# Patient Record
Sex: FEMALE | Race: WHITE | NOT HISPANIC OR LATINO | Employment: OTHER | URBAN - METROPOLITAN AREA
[De-identification: names, ages, dates, MRNs, and addresses within clinical notes are randomized per-mention and may not be internally consistent; named-entity substitution may affect disease eponyms.]

---

## 2023-09-14 PROBLEM — N83.299 OTHER OVARIAN CYST, UNSPECIFIED SIDE: Status: ACTIVE | Noted: 2023-09-14

## 2023-09-14 PROBLEM — M51.36 DEGENERATION OF LUMBAR INTERVERTEBRAL DISC: Status: ACTIVE | Noted: 2023-09-14

## 2023-09-14 PROBLEM — N28.89 RIGHT RENAL MASS: Status: ACTIVE | Noted: 2023-09-14

## 2023-09-14 PROBLEM — M51.369 DEGENERATION OF LUMBAR INTERVERTEBRAL DISC: Status: ACTIVE | Noted: 2023-09-14

## 2023-09-14 PROBLEM — M19.90 ARTHRITIS: Status: ACTIVE | Noted: 2023-09-14

## 2023-09-14 PROBLEM — M54.16 LUMBAR RADICULOPATHY: Status: ACTIVE | Noted: 2023-09-14

## 2023-09-14 PROBLEM — N36.2 URETHRAL CARUNCLE: Status: ACTIVE | Noted: 2023-09-14

## 2023-09-14 PROBLEM — N95.2 ATROPHIC VAGINITIS: Status: ACTIVE | Noted: 2023-09-14

## 2023-09-14 PROBLEM — M17.0 ARTHRITIS OF BOTH KNEES: Status: ACTIVE | Noted: 2023-09-14

## 2023-09-14 PROBLEM — M21.162 VARUS DEFORMITY, NOT ELSEWHERE CLASSIFIED, LEFT KNEE: Status: ACTIVE | Noted: 2023-09-14

## 2023-09-14 PROBLEM — M19.90 OSTEOARTHRITIS: Status: ACTIVE | Noted: 2023-09-14

## 2023-09-14 PROBLEM — G89.4 CHRONIC PAIN SYNDROME: Status: ACTIVE | Noted: 2023-09-14

## 2023-09-14 RX ORDER — ESTRADIOL 0.1 MG/G
2 CREAM VAGINAL DAILY
COMMUNITY

## 2023-09-14 RX ORDER — POLYETHYLENE GLYCOL 3350 17 G/17G
17 POWDER, FOR SOLUTION ORAL
COMMUNITY
Start: 2022-12-05

## 2023-09-14 RX ORDER — NAPROXEN SODIUM 220 MG
220 TABLET ORAL EVERY 12 HOURS PRN
COMMUNITY

## 2023-09-14 RX ORDER — ALPRAZOLAM 2 MG/1
2 TABLET ORAL
COMMUNITY

## 2023-09-14 RX ORDER — ASPIRIN 81 MG/1
81 TABLET ORAL DAILY
COMMUNITY
Start: 2022-12-05

## 2023-09-14 RX ORDER — FLUTICASONE PROPIONATE 50 MCG
1 SPRAY, SUSPENSION (ML) NASAL DAILY
COMMUNITY

## 2023-09-14 RX ORDER — HYDROGEN PEROXIDE 3 %
1 SOLUTION, NON-ORAL MISCELLANEOUS DAILY
COMMUNITY

## 2023-09-14 RX ORDER — ALPRAZOLAM 0.25 MG/1
0.25 TABLET ORAL 2 TIMES DAILY
COMMUNITY
End: 2023-11-15 | Stop reason: WASHOUT

## 2023-09-14 RX ORDER — AMLODIPINE BESYLATE 2.5 MG/1
2.5 TABLET ORAL
COMMUNITY
Start: 2022-08-10

## 2023-09-14 RX ORDER — ALPRAZOLAM 1 MG/1
1 TABLET ORAL EVERY 12 HOURS PRN
COMMUNITY
Start: 2023-09-14 | End: 2023-11-15 | Stop reason: WASHOUT

## 2023-09-14 RX ORDER — IBUPROFEN 200 MG
200 TABLET ORAL DAILY PRN
COMMUNITY

## 2023-09-14 RX ORDER — HYDROCODONE BITARTRATE AND ACETAMINOPHEN 5; 325 MG/1; MG/1
1 TABLET ORAL EVERY 8 HOURS PRN
COMMUNITY
Start: 2023-05-19 | End: 2023-11-15 | Stop reason: SDUPTHER

## 2023-09-14 RX ORDER — BUPRENORPHINE 5 UG/H
1 PATCH TRANSDERMAL
COMMUNITY
Start: 2023-05-19 | End: 2023-11-15 | Stop reason: WASHOUT

## 2023-09-14 RX ORDER — NALOXONE HYDROCHLORIDE 4 MG/.1ML
4 SPRAY NASAL AS NEEDED
COMMUNITY
Start: 2019-03-05

## 2023-09-20 PROBLEM — H25.9 AGE-RELATED CATARACT: Status: ACTIVE | Noted: 2023-09-20

## 2023-10-17 ENCOUNTER — OFFICE VISIT (OUTPATIENT)
Dept: UROLOGY | Facility: CLINIC | Age: 88
End: 2023-10-17
Payer: MEDICARE

## 2023-10-17 VITALS
WEIGHT: 122.4 LBS | BODY MASS INDEX: 22.39 KG/M2 | DIASTOLIC BLOOD PRESSURE: 60 MMHG | SYSTOLIC BLOOD PRESSURE: 138 MMHG | HEART RATE: 70 BPM

## 2023-10-17 DIAGNOSIS — N36.2 URETHRAL CARUNCLE: ICD-10-CM

## 2023-10-17 DIAGNOSIS — Z96.0 PRESENCE OF PESSARY: ICD-10-CM

## 2023-10-17 DIAGNOSIS — Z46.89 PESSARY MAINTENANCE: Primary | ICD-10-CM

## 2023-10-17 DIAGNOSIS — N95.2 ATROPHIC VAGINITIS: ICD-10-CM

## 2023-10-17 DIAGNOSIS — N81.10 VAGINAL PROLAPSE: ICD-10-CM

## 2023-10-17 PROCEDURE — 1036F TOBACCO NON-USER: CPT | Performed by: OBSTETRICS & GYNECOLOGY

## 2023-10-17 PROCEDURE — 99213 OFFICE O/P EST LOW 20 MIN: CPT | Performed by: OBSTETRICS & GYNECOLOGY

## 2023-10-17 ASSESSMENT — ENCOUNTER SYMPTOMS
DEPRESSION: 0
OCCASIONAL FEELINGS OF UNSTEADINESS: 0
LOSS OF SENSATION IN FEET: 0

## 2023-10-17 NOTE — PROGRESS NOTES
Subjective   Patient ID: Kika Amor is a 88 y.o. female who presents for pessary maintenance.    HPI  88-year-old with history of #4 cube pessary, stable 3 cm simple adnexal cyst, history of bilateral kidney cysts, significant vaginal atrophy and weakness with prolapsing hemorrhoids, lower extremity hardware preventing external rotation of the bilateral hip joints, presenting with a 2.75 inch Gellhorn short stem placed 5/16/2023    The patient appears to be extremely satisfied with the pessary, she denies any bugle complaints. She denies any vaginal complaints, no discharge.  She is utilizing the vaginal estrogen cream .    Ultrasound pelvis redemonstrated  previously-seen right ovarian cyst, now  measuring 3.3 x 2.9 x 2.7 cm, previously measuring 3.4 x 3.2 x 2.2 cm. She is noting resolution of her pain complaints.    She denies any lower urinary tract complaints. She denies any UTI like symptoms.     She denies any bowel related complaints, no fecal or flatal incontinence.    She has no other complaints.    From Previous note  88-year-old with history of #4 cube pessary, stable 3 cm simple adnexal cyst, history of bilateral kidney cysts, significant vaginal atrophy and weakness with prolapsing hemorrhoids, lower extremity hardware preventing external rotation of the bilateral hip joints, presenting for a 2.75 inch Gellhorn short stem pessary refitting.     The patient appears to be extremely satisfied with the pessary, she denies any bugle complaints. She reports pain associated with her right ovarian cysts, pelvic ultrasound form 1/27/2023 demonstrates 3.4 cm left ovarian cyst. According to her report while undergoing a kidney ultrasound the radiologist evaluated her pelvis and it was extremely painful, there is no official report of this.    She denies any lower urinary tract complaints. She denies any UTI like symptoms.     She denies any bowel related complaints, no fecal or flatal incontinence.    She  has no other complaints.    From Previous note  88-year-old with history of #4 cube pessary, stable 3 cm simple adnexal cyst, history of bilateral kidney cysts, significant vaginal atrophy and weakness with prolapsing hemorrhoids, lower extremity hardware preventing external rotation of the bilateral hip joints, presenting for a 2.75 inch Gellhorn short stem pessary refitting.      The patient s continues to note and is bothered by the bulge with her present 2.5 inch Gellhorn short stem in place.She states she is able to void in the morning but notes that its bothersome when she moves around. Denies any bowel changes with the pessary in place     She has no other complaints.     From previous note  88-year-old with history of #4 cube pessary, stable 3 cm simple adnexal cyst, history of bilateral kidney cysts, significant vaginal atrophy and weakness with prolapsing hemorrhoids presenting for pessary follow-up.     The patient s continues to note and is bothered by the bulge. She believes that the pessary was exposed but on exam it is in place. She also complaints of sensation of incomplete bladder emptying. She states she is able to void in the morning but notes that its bothersome when she moves around. Surgical correction was discussed at length. She is not utilizing the vaginal estrogen cream. however she notes bleeding.     She denies any bowel related complaints, no fecal or flatal incontinence.     She has no other complaints.     From previous note  88-year-old with history of #4 cube pessary, stable 3 cm simple adnexal cyst, history of bilateral kidney cysts, significant vaginal atrophy and weakness with prolapsing hemorrhoids having undergone acute pessary removal and pessary refitting on 2/9/2023.     The patient presents today with discomfort associated with this pessary, she states it is coming down and she is feeling the bulge despite the pessary being in place.      She denies any bowel related  "complaints, no fecal or flatal incontinence.     She has no other complaints.     From previous note  88-year-old with cube pessary in place, history of adnexal cyst on July 2022 [facility] ultrasound, history of bilateral kidney cysts, significant vaginal atrophy and weakness with prolapsing hemorrhoids presenting today for pessary refitting.     She has no other complaints.         From previous note  88-year-old presenting as a referral from Dr. Sophia Boyce with complaints of urinary urgency, kidney mass and history of gross hematuria with pessary in place.     The patient was previously seen by Dr. Sophia Boyce for kidney mass and gross hematuria with a benign cystoscopy performed 12/5/2022. She also has a pessary in place which Dr. Miles replaced with a smaller size and is working better. Most recent pessary maintenance was performed earlier in January. She states that this is a cube. She is having this removed and replaced every 2 months. She denies and bleeding except for the when the pessary is being manipulated. She also complaints of urinary urgency, she notes 1-2 episodes of nocturia but otherwise denies any significant daytime frequency or urgency. She wears a pad to avoid accidents but she does not leak unless she is \"bending over\". She denies any history of stress urinary incontinence.      She is not sexually active and was noted to have a 3 cm ovarian cyst in July 2022 on [facility] imaging. She denies any vaginal complaints, no abnormal vaginal bleeding or discharge.     She has a regular soft bowel movement, She denies any bowel related complaints, no fecal or flatal incontinence.     She has no other complaints.      Review of Systems  Constitutional: No fever, No chills and No fatigue.   Eyes: No vision problems and No dryness of the eyes.   ENT: No dry mouth, No hearing loss and No nosebleeds.   Cardiovascular: No chest pain, No palpitations and No orthopnea.   Respiratory: No shortness of breath, No cough " and No wheezing.   Gastrointestinal: No abdominal pain, No constipation, No nausea, No diarrhea, No vomiting and No melena.   Genitourinary: As noted in HPI.   Musculoskeletal: No back pain, No myalgias, No muscle weakness, No joint swelling and No leg edema.   Integumentary: No rashes, No skin lesion and No itching.   Neurological: No headache, No numbness and No dizziness.   Psychiatric: No sleep disturbances, No anxiety and No depression.   Endocrine: No hot flashes, No loss of hair and No hirsutism.   Hematologic/Lymphatic: No swollen glands, No tendency for easy bleeding and No tendency for easy bruising.   All other systems have been reviewed and are negative for complaint.        Objective   Physical Exam  Patient ID: Kika Amor is a 88 y.o. female.    Procedures  FP[facility] Procedure:  New placement of pessary and Pessary removal.   Indication (s): pelvic organ prolapse.   Findings include: cystocele, atrophy and A 2.5 inch Gellhorn short stem pessary was noted to be in place. This was removed with some difficulty and replaced with a 2.75 inch Gellhorn short stem.  Lidocaine gel was used.  Pessary Placement: KIKA was fitted with 2.75 inch Gellhorn short stem pessary.        Assessment/Plan      88-year-old with history of #4 cube pessary, stable 3 cm simple adnexal cyst, history of bilateral kidney cysts, significant vaginal atrophy and weakness with prolapsing hemorrhoids, lower extremity hardware preventing external rotation of the bilateral hip joints, presenting for a 2.75 inch Gellhorn short stem placed 5/16/2023     #1 the patient is to continue to follow-up with Dr. Sheppard. Recent ultrasound imaging 11/2/2022 demonstrated a solid 2.4 cm mass of the right pole and several cysts of the bilateral kidneys measuring up to 3 to 4 cm. She has had multiple imaging performed through [facility]. Patient will have yearly follow up with ultrasound per his recommendations.     #2 we discussed her previous imaging  performed in July 2022 demonstrating a 3 cm ovarian cyst. Repeat pelvic ultrasound 1/27/2023 demonstrates stable simple 3 cm ovarian cyst. She states that during her recent Kidney ultrasound her ultrasonographer imaged her right lower quadrant which caused significant  discomfort following evaluation.  This pain is significantly improved.  No further imaging is indicated at this time.     3. Patient's cube pessary was removed 2/9/2023 with excoriations and discharge noted and the patient was refitted with a 2.25 inch Gellhorn short stem. The patient was dissatisfied with this and it was refitted 3/7/2023 with a 2.5 inch Gellhorn short stem. She states that she continues to note vaginal bulging around this pessary when on her feet. She denies any pain noted some difficulty with urination. She was refitted with a 2.75 inch Gellhorn short stem 5/16/2023. We did discuss at length potential for surgical options. She is a candidate for a sacrospinous ligament suspension but she has significantly reduced external rotation of her bilateral hip joints. However there is access vaginally. We did briefly discussed the risks of bleeding, infection, damage surrounding tissues, postoperative restrictions, and success. Continue her Gellhorn pessary moving forward.  There was some difficulty in removing and replacing the pessary today 10/17/2023 and we will readdress the need for possible refitting with a 2.5 inch Gellhorn short stem at her follow-up visits.     4. The patient is unbothered by her hemorrhoids. We discussed the importance of fiber therapy daily to improve her bowel movement constipation complaints.     5. She will continue her vaginal estrogen therapy 3 times a week moving forward.     #6 We will follow-up in February/March 2024 for pessary maintenance.     YADIRA Youssef MD     Scribe Attestation  By signing my name below, Danae HAMMONDS Scribe attest that this documentation has been prepared under the  direction and in the presence of Sang Youssef MD. All medical record entries made by the Scribe were at my direction or personally dictated by me. I have reviewed the chart and agree that the record accurately reflects my personal performance of the history, physical exam, discussion and plan.

## 2023-11-15 ENCOUNTER — OFFICE VISIT (OUTPATIENT)
Dept: PAIN MEDICINE | Facility: CLINIC | Age: 88
End: 2023-11-15
Payer: MEDICARE

## 2023-11-15 VITALS
DIASTOLIC BLOOD PRESSURE: 72 MMHG | WEIGHT: 117 LBS | HEART RATE: 66 BPM | SYSTOLIC BLOOD PRESSURE: 139 MMHG | HEIGHT: 62 IN | BODY MASS INDEX: 21.53 KG/M2 | RESPIRATION RATE: 22 BRPM

## 2023-11-15 DIAGNOSIS — M25.551 PAIN OF BOTH HIP JOINTS: ICD-10-CM

## 2023-11-15 DIAGNOSIS — M25.552 PAIN OF BOTH HIP JOINTS: ICD-10-CM

## 2023-11-15 DIAGNOSIS — M17.0 PRIMARY OSTEOARTHRITIS OF KNEES, BILATERAL: ICD-10-CM

## 2023-11-15 DIAGNOSIS — M51.36 DEGENERATION OF LUMBAR INTERVERTEBRAL DISC: Primary | ICD-10-CM

## 2023-11-15 PROBLEM — H35.051 CHOROIDAL NEOVASCULARIZATION OF RIGHT EYE: Status: ACTIVE | Noted: 2020-10-01

## 2023-11-15 PROBLEM — H25.11 NUCLEAR SCLEROTIC CATARACT, RIGHT: Status: ACTIVE | Noted: 2020-10-01

## 2023-11-15 PROBLEM — N28.89 RENAL MASS, RIGHT: Status: ACTIVE | Noted: 2023-11-15

## 2023-11-15 PROBLEM — S22.49XA RIB FRACTURES: Status: ACTIVE | Noted: 2019-05-22

## 2023-11-15 PROBLEM — K44.9 HIATAL HERNIA: Status: ACTIVE | Noted: 2023-11-15

## 2023-11-15 PROBLEM — H35.3211 EXUDATIVE AGE-RELATED MACULAR DEGENERATION OF RIGHT EYE WITH ACTIVE CHOROIDAL NEOVASCULARIZATION (MULTI): Status: ACTIVE | Noted: 2020-10-01

## 2023-11-15 PROBLEM — H25.12 SENILE NUCLEAR CATARACT, LEFT: Status: ACTIVE | Noted: 2017-09-11

## 2023-11-15 PROBLEM — H47.10 OPTIC NERVE EDEMA: Status: ACTIVE | Noted: 2020-10-01

## 2023-11-15 PROBLEM — K57.92 DIVERTICULITIS: Status: ACTIVE | Noted: 2017-01-31

## 2023-11-15 PROCEDURE — 99214 OFFICE O/P EST MOD 30 MIN: CPT | Performed by: PHYSICIAN ASSISTANT

## 2023-11-15 PROCEDURE — 1160F RVW MEDS BY RX/DR IN RCRD: CPT | Performed by: PHYSICIAN ASSISTANT

## 2023-11-15 PROCEDURE — 1125F AMNT PAIN NOTED PAIN PRSNT: CPT | Performed by: PHYSICIAN ASSISTANT

## 2023-11-15 PROCEDURE — 1159F MED LIST DOCD IN RCRD: CPT | Performed by: PHYSICIAN ASSISTANT

## 2023-11-15 PROCEDURE — 1036F TOBACCO NON-USER: CPT | Performed by: PHYSICIAN ASSISTANT

## 2023-11-15 RX ORDER — HYDROCODONE BITARTRATE AND ACETAMINOPHEN 5; 325 MG/1; MG/1
1 TABLET ORAL EVERY 8 HOURS PRN
Qty: 90 TABLET | Refills: 0 | Status: SHIPPED | OUTPATIENT
Start: 2023-11-15 | End: 2023-12-15 | Stop reason: WASHOUT

## 2023-11-15 RX ORDER — DORZOLAMIDE HYDROCHLORIDE AND TIMOLOL MALEATE 20; 5 MG/ML; MG/ML
1 SOLUTION/ DROPS OPHTHALMIC 2 TIMES DAILY
COMMUNITY

## 2023-11-15 ASSESSMENT — LIFESTYLE VARIABLES
HOW MANY STANDARD DRINKS CONTAINING ALCOHOL DO YOU HAVE ON A TYPICAL DAY: PATIENT DOES NOT DRINK
HOW OFTEN DO YOU HAVE A DRINK CONTAINING ALCOHOL: NEVER
HOW OFTEN DO YOU HAVE SIX OR MORE DRINKS ON ONE OCCASION: NEVER
TOTAL SCORE: 0
SKIP TO QUESTIONS 9-10: 1
AUDIT-C TOTAL SCORE: 0

## 2023-11-15 ASSESSMENT — ENCOUNTER SYMPTOMS
ACTIVITY CHANGE: 0
VOMITING: 0
FATIGUE: 0
PALPITATIONS: 0
ARTHRALGIAS: 1
NAUSEA: 0
SORE THROAT: 0
VOICE CHANGE: 0
SLEEP DISTURBANCE: 0
COUGH: 0
UNEXPECTED WEIGHT CHANGE: 0
DIARRHEA: 0
SHORTNESS OF BREATH: 0
JOINT SWELLING: 1
BACK PAIN: 1
CHEST TIGHTNESS: 0
FEVER: 0
CHILLS: 0

## 2023-11-15 ASSESSMENT — PAIN SCALES - GENERAL
PAINLEVEL: 9
PAINLEVEL_OUTOF10: 9

## 2023-11-15 ASSESSMENT — PATIENT HEALTH QUESTIONNAIRE - PHQ9
2. FEELING DOWN, DEPRESSED OR HOPELESS: NOT AT ALL
1. LITTLE INTEREST OR PLEASURE IN DOING THINGS: NOT AT ALL
SUM OF ALL RESPONSES TO PHQ9 QUESTIONS 1 & 2: 0

## 2023-11-15 ASSESSMENT — PAIN DESCRIPTION - DESCRIPTORS: DESCRIPTORS: ACHING

## 2023-11-15 ASSESSMENT — PAIN - FUNCTIONAL ASSESSMENT: PAIN_FUNCTIONAL_ASSESSMENT: 0-10

## 2023-11-15 NOTE — PROGRESS NOTES
Patient has mid back pain.  Knees are bone-on-bone.  She also has hardware in her hips.    MEDICATION NAME: hydrocodone / acet  STRENGTH: 5/325 mg  LAST FILL DATE: 10/24/2023  DATE LAST TAKEN: 11/15/2023  QUANTITY FILLED: 90  QUANTITY REMAIN  COUNT COMPLETED BY: CLEVELAND SUBRAMANIAN LPN

## 2023-11-15 NOTE — PROGRESS NOTES
Subjective   Patient ID: Kika Amor is a 89 y.o. female who presents for Back Pain (Mid back, right knee.).  89-year-old female with bilateral hip pain related to fractures bilateral knee pain.  Patient did notes that she is having increases in her mid back pain.  She also is continually being monitored for renal mass.  She did notes that she is having glaucoma and has been added on new medication she now has to stop the Unisom which was beneficial for nighttime because it contains Benadryl and could be problematic for her vision.  She denies that she is still able to function within her home and the pain patch was that we used in the past was better for her back pain but not for the knee pain.  She continues to use 3-day she continues to use her benzodiazepine she does not use them at the same time and she will not drive when she is on her Xanax.  She does feel safe at home and she uses assistive devices.  She does note that there is a lot of neighbors in her neighborhood who will keep an eye on her and her sons are about 5 minutes away from her home    Back Pain  Pertinent negatives include no chest pain or fever.       Review of Systems   Constitutional:  Negative for activity change, chills, fatigue, fever and unexpected weight change.   HENT:  Negative for ear pain, sore throat and voice change.    Eyes:  Negative for visual disturbance.   Respiratory:  Negative for cough, chest tightness and shortness of breath.    Cardiovascular:  Negative for chest pain and palpitations.   Gastrointestinal:  Negative for diarrhea, nausea and vomiting.   Musculoskeletal:  Positive for arthralgias, back pain, gait problem and joint swelling.   Psychiatric/Behavioral:  Negative for behavioral problems, self-injury, sleep disturbance and suicidal ideas.        Objective   Physical Exam  Vitals reviewed.   Constitutional:       Appearance: Normal appearance.   HENT:      Head: Normocephalic and atraumatic.       Mouth/Throat:      Mouth: Mucous membranes are moist.   Neck:      Vascular: No JVD.   Pulmonary:      Effort: Pulmonary effort is normal. No tachypnea or bradypnea.   Abdominal:      Palpations: Abdomen is soft.   Musculoskeletal:      Thoracic back: Tenderness present.      Lumbar back: Tenderness present. Decreased range of motion. Positive left straight leg raise test. Negative right straight leg raise test.        Back:       Right knee: Swelling and crepitus present. Decreased range of motion. Tenderness present over the medial joint line and lateral joint line.      Left knee: Crepitus present. Decreased range of motion. Tenderness present over the medial joint line and lateral joint line.   Skin:     General: Skin is warm and dry.   Neurological:      Mental Status: She is alert and oriented to person, place, and time.   Psychiatric:         Mood and Affect: Mood normal.         Behavior: Behavior normal. Behavior is cooperative.       Assessment/Plan   Problem List Items Addressed This Visit             ICD-10-CM    Degeneration of lumbar intervertebral disc - Primary M51.36     I had nice discussion with the patient today our plan will be as follows.      Radiology: [ none at this time ]      Physically:  [ continue modification of activities, healthy lifestyle choice, discussed fall risk mitigation and use assistive devices. ]      Psychologically:  [ No acute psychological concerns ]      Medication: [I will refill the medications at the same dose and frequency. We will continue to monitor the patient bimonthly for compliance, adverse reaction or interations The patient continues to see benefit and improvement in their quality of life and ability to maintain ADLs. Patient educated about the risks of taking opioids and operating a motor vehicle. Patient reports no adverse side effects to current medication regimen. Current regimen does allow patient to maintain ADLs. Oarrs has been reviewed. No suspicion  of diversion or abuse. Compliance with medication regime, no use of illicit drugs, no sharing of narcotic medications with others, do not use others narcotic medication, and to avoid alcohol use. Patient has been educated on the risks, benefits, and alternatives of controlled substances as well as the proper way to store these medications.   The patient and I discussed the nature of this medication and its side effects. We discussed tolerance, physical dependence, psychological dependence, addiction and opioid-induced hyperalgesia ]      Duration:  [ 2 months ]      Intervention:  [ none at this time. Patient is stable.  ]           Relevant Medications    HYDROcodone-acetaminophen (Norco) 5-325 mg tablet    HYDROcodone-acetaminophen (Norco) 5-325 mg tablet    Primary osteoarthritis of knees, bilateral M17.0    Relevant Medications    HYDROcodone-acetaminophen (Norco) 5-325 mg tablet    HYDROcodone-acetaminophen (Norco) 5-325 mg tablet     Other Visit Diagnoses         Codes    Pain of both hip joints     M25.551, M25.552    Relevant Medications    HYDROcodone-acetaminophen (Norco) 5-325 mg tablet    HYDROcodone-acetaminophen (Norco) 5-325 mg tablet

## 2023-11-15 NOTE — ASSESSMENT & PLAN NOTE
I had nice discussion with the patient today our plan will be as follows.      Radiology: [ none at this time ]      Physically:  [ continue modification of activities, healthy lifestyle choice, discussed fall risk mitigation and use assistive devices. ]      Psychologically:  [ No acute psychological concerns ]      Medication: [I will refill the medications at the same dose and frequency. We will continue to monitor the patient bimonthly for compliance, adverse reaction or interations The patient continues to see benefit and improvement in their quality of life and ability to maintain ADLs. Patient educated about the risks of taking opioids and operating a motor vehicle. Patient reports no adverse side effects to current medication regimen. Current regimen does allow patient to maintain ADLs. Oarrs has been reviewed. No suspicion of diversion or abuse. Compliance with medication regime, no use of illicit drugs, no sharing of narcotic medications with others, do not use others narcotic medication, and to avoid alcohol use. Patient has been educated on the risks, benefits, and alternatives of controlled substances as well as the proper way to store these medications.   The patient and I discussed the nature of this medication and its side effects. We discussed tolerance, physical dependence, psychological dependence, addiction and opioid-induced hyperalgesia ]      Duration:  [ 2 months ]      Intervention:  [ none at this time. Patient is stable.  ]    
no...

## 2023-11-16 ENCOUNTER — OFFICE VISIT (OUTPATIENT)
Dept: ORTHOPEDIC SURGERY | Facility: CLINIC | Age: 88
End: 2023-11-16
Payer: MEDICARE

## 2023-11-16 DIAGNOSIS — M17.11 PRIMARY OSTEOARTHRITIS OF RIGHT KNEE: Primary | ICD-10-CM

## 2023-11-16 PROCEDURE — 2500000004 HC RX 250 GENERAL PHARMACY W/ HCPCS (ALT 636 FOR OP/ED): Performed by: ORTHOPAEDIC SURGERY

## 2023-11-16 PROCEDURE — 1125F AMNT PAIN NOTED PAIN PRSNT: CPT | Performed by: ORTHOPAEDIC SURGERY

## 2023-11-16 PROCEDURE — 2500000005 HC RX 250 GENERAL PHARMACY W/O HCPCS: Performed by: ORTHOPAEDIC SURGERY

## 2023-11-16 PROCEDURE — 1159F MED LIST DOCD IN RCRD: CPT | Performed by: ORTHOPAEDIC SURGERY

## 2023-11-16 PROCEDURE — 20610 DRAIN/INJ JOINT/BURSA W/O US: CPT | Performed by: ORTHOPAEDIC SURGERY

## 2023-11-16 PROCEDURE — 1036F TOBACCO NON-USER: CPT | Performed by: ORTHOPAEDIC SURGERY

## 2023-11-16 PROCEDURE — 1160F RVW MEDS BY RX/DR IN RCRD: CPT | Performed by: ORTHOPAEDIC SURGERY

## 2023-11-16 RX ORDER — TRIAMCINOLONE ACETONIDE 40 MG/ML
1 INJECTION, SUSPENSION INTRA-ARTICULAR; INTRAMUSCULAR
Status: COMPLETED | OUTPATIENT
Start: 2023-11-16 | End: 2023-11-16

## 2023-11-16 RX ORDER — LIDOCAINE HYDROCHLORIDE 10 MG/ML
4 INJECTION INFILTRATION; PERINEURAL
Status: COMPLETED | OUTPATIENT
Start: 2023-11-16 | End: 2023-11-16

## 2023-11-16 RX ADMIN — TRIAMCINOLONE ACETONIDE 1 ML: 40 INJECTION, SUSPENSION INTRA-ARTICULAR; INTRAMUSCULAR at 13:48

## 2023-11-16 RX ADMIN — LIDOCAINE HYDROCHLORIDE 4 ML: 10 INJECTION, SOLUTION INFILTRATION; PERINEURAL at 13:48

## 2023-11-16 ASSESSMENT — PAIN SCALES - GENERAL: PAINLEVEL_OUTOF10: 4

## 2023-11-16 NOTE — PROGRESS NOTES
L Inj/Asp: R knee on 11/16/2023 1:48 PM  Indications: pain and joint swelling  Details: 22 G needle, anterolateral approach  Medications: 1 mL triamcinolone acetonide 40 mg/mL; 4 mL lidocaine 10 mg/mL (1 %)    Discussion:  I discussed the conservative treatment options for knee osteoarthritis including but not limited to physical therapy, oral NSAIDS, activity and lifestyle modification, and corticosteroid injections. Pt has elected to undergo a cortisone injection today. I have explained the risk and benefits of an injection including the possibility of joint infection, bleeding, damage to cartilage, allergic reaction. Patient verbalized understanding and gave verbal consent wishes to proceed with a intra-articular cortisone injection for their knee.    Procedure:  After discussing the risk and benefits of the procedure, we proceeded with an intra-articular right knee injection.    With the patient's informed verbal consent, the right knee was prepped in standard sterile fashion with Chlorhexidine. The skin was then anesthetized with ethyl chloride spray and cleaned again with Chlorhexidine. The knee was then apirated/injected with a prefilled 20-gauge syringe of 40 mg Kenalog + 4 ml Lidocaine using the lateral approach without complications.  The patient tolerated this well and felt immediate initial relief of symptoms. A bandaid was applied and the patient ambulated out of the clinic on ther own accord without difficulty. Patient was instructed to avoid physical activity for 24-48 hours to prevent the knees from swelling and may ice the knees as tolerated. Patient should contact the office if any signs of of infection appear: redness, fever, chills, drainage, swelling or warmth to the knees.  Pt understands that the injections can be repeated no sooner than 3 months.    Procedure, treatment alternatives, risks and benefits explained, specific risks discussed. Consent was given by the patient. Immediately prior to  procedure a time out was called to verify the correct patient, procedure, equipment, support staff and site/side marked as required. Patient was prepped and draped in the usual sterile fashion.

## 2023-12-07 ENCOUNTER — OFFICE VISIT (OUTPATIENT)
Dept: ORTHOPEDIC SURGERY | Facility: CLINIC | Age: 88
End: 2023-12-07
Payer: MEDICARE

## 2023-12-07 DIAGNOSIS — M17.12 PRIMARY OSTEOARTHRITIS OF LEFT KNEE: Primary | ICD-10-CM

## 2023-12-07 PROCEDURE — 2500000005 HC RX 250 GENERAL PHARMACY W/O HCPCS: Performed by: ORTHOPAEDIC SURGERY

## 2023-12-07 PROCEDURE — 1160F RVW MEDS BY RX/DR IN RCRD: CPT | Performed by: ORTHOPAEDIC SURGERY

## 2023-12-07 PROCEDURE — 1125F AMNT PAIN NOTED PAIN PRSNT: CPT | Performed by: ORTHOPAEDIC SURGERY

## 2023-12-07 PROCEDURE — 2500000004 HC RX 250 GENERAL PHARMACY W/ HCPCS (ALT 636 FOR OP/ED): Performed by: ORTHOPAEDIC SURGERY

## 2023-12-07 PROCEDURE — 1036F TOBACCO NON-USER: CPT | Performed by: ORTHOPAEDIC SURGERY

## 2023-12-07 PROCEDURE — 20610 DRAIN/INJ JOINT/BURSA W/O US: CPT | Mod: LT | Performed by: ORTHOPAEDIC SURGERY

## 2023-12-07 PROCEDURE — 1159F MED LIST DOCD IN RCRD: CPT | Performed by: ORTHOPAEDIC SURGERY

## 2023-12-07 PROCEDURE — 20610 DRAIN/INJ JOINT/BURSA W/O US: CPT | Performed by: ORTHOPAEDIC SURGERY

## 2023-12-07 RX ORDER — TRIAMCINOLONE ACETONIDE 40 MG/ML
1 INJECTION, SUSPENSION INTRA-ARTICULAR; INTRAMUSCULAR
Status: COMPLETED | OUTPATIENT
Start: 2023-12-07 | End: 2023-12-07

## 2023-12-07 RX ORDER — LIDOCAINE HYDROCHLORIDE 10 MG/ML
4 INJECTION INFILTRATION; PERINEURAL
Status: COMPLETED | OUTPATIENT
Start: 2023-12-07 | End: 2023-12-07

## 2023-12-07 RX ADMIN — LIDOCAINE HYDROCHLORIDE 4 ML: 10 INJECTION, SOLUTION INFILTRATION; PERINEURAL at 14:20

## 2023-12-07 RX ADMIN — TRIAMCINOLONE ACETONIDE 1 ML: 400 INJECTION, SUSPENSION INTRA-ARTICULAR; INTRAMUSCULAR at 14:20

## 2023-12-07 ASSESSMENT — PAIN - FUNCTIONAL ASSESSMENT: PAIN_FUNCTIONAL_ASSESSMENT: 0-10

## 2023-12-07 ASSESSMENT — PAIN SCALES - GENERAL: PAINLEVEL_OUTOF10: 3

## 2024-01-18 ENCOUNTER — OFFICE VISIT (OUTPATIENT)
Dept: PAIN MEDICINE | Facility: CLINIC | Age: 89
End: 2024-01-18
Payer: MEDICARE

## 2024-01-18 VITALS
WEIGHT: 117 LBS | BODY MASS INDEX: 21.53 KG/M2 | SYSTOLIC BLOOD PRESSURE: 138 MMHG | DIASTOLIC BLOOD PRESSURE: 69 MMHG | HEIGHT: 62 IN | HEART RATE: 66 BPM

## 2024-01-18 DIAGNOSIS — M17.0 PRIMARY OSTEOARTHRITIS OF KNEES, BILATERAL: Primary | ICD-10-CM

## 2024-01-18 DIAGNOSIS — Z79.899 HISTORY OF ONGOING TREATMENT WITH HIGH-RISK MEDICATION: ICD-10-CM

## 2024-01-18 DIAGNOSIS — M54.16 LUMBAR RADICULOPATHY: ICD-10-CM

## 2024-01-18 DIAGNOSIS — M51.36 DEGENERATION OF LUMBAR INTERVERTEBRAL DISC: ICD-10-CM

## 2024-01-18 PROCEDURE — 99214 OFFICE O/P EST MOD 30 MIN: CPT | Performed by: PHYSICIAN ASSISTANT

## 2024-01-18 PROCEDURE — 1036F TOBACCO NON-USER: CPT | Performed by: PHYSICIAN ASSISTANT

## 2024-01-18 PROCEDURE — 1125F AMNT PAIN NOTED PAIN PRSNT: CPT | Performed by: PHYSICIAN ASSISTANT

## 2024-01-18 PROCEDURE — 1159F MED LIST DOCD IN RCRD: CPT | Performed by: PHYSICIAN ASSISTANT

## 2024-01-18 PROCEDURE — 80346 BENZODIAZEPINES1-12: CPT | Mod: WESLAB | Performed by: PHYSICIAN ASSISTANT

## 2024-01-18 PROCEDURE — 80354 DRUG SCREENING FENTANYL: CPT | Mod: WESLAB,MUE | Performed by: PHYSICIAN ASSISTANT

## 2024-01-18 PROCEDURE — 1160F RVW MEDS BY RX/DR IN RCRD: CPT | Performed by: PHYSICIAN ASSISTANT

## 2024-01-18 PROCEDURE — 80307 DRUG TEST PRSMV CHEM ANLYZR: CPT | Mod: WESLAB | Performed by: PHYSICIAN ASSISTANT

## 2024-01-18 RX ORDER — HYDROCODONE BITARTRATE AND ACETAMINOPHEN 5; 325 MG/1; MG/1
1 TABLET ORAL EVERY 4 HOURS PRN
Qty: 90 TABLET | Refills: 0 | Status: SHIPPED | OUTPATIENT
Start: 2024-01-18 | End: 2024-02-17 | Stop reason: WASHOUT

## 2024-01-18 RX ORDER — HYDROCODONE BITARTRATE AND ACETAMINOPHEN 5; 325 MG/1; MG/1
1 TABLET ORAL EVERY 4 HOURS PRN
COMMUNITY
Start: 2023-12-26 | End: 2024-01-18 | Stop reason: SDUPTHER

## 2024-01-18 ASSESSMENT — ENCOUNTER SYMPTOMS
NAUSEA: 0
FEVER: 0
BACK PAIN: 1
VOICE CHANGE: 0
ACTIVITY CHANGE: 0
VOMITING: 0
CHILLS: 0
ARTHRALGIAS: 1
UNEXPECTED WEIGHT CHANGE: 0
JOINT SWELLING: 1
COUGH: 0
SHORTNESS OF BREATH: 0
CHEST TIGHTNESS: 0
PALPITATIONS: 0
DIARRHEA: 0
SLEEP DISTURBANCE: 0
FATIGUE: 0
SORE THROAT: 0

## 2024-01-18 ASSESSMENT — PAIN SCALES - GENERAL: PAINLEVEL_OUTOF10: 8

## 2024-01-18 ASSESSMENT — PATIENT HEALTH QUESTIONNAIRE - PHQ9
SUM OF ALL RESPONSES TO PHQ9 QUESTIONS 1 AND 2: 0
1. LITTLE INTEREST OR PLEASURE IN DOING THINGS: NOT AT ALL
2. FEELING DOWN, DEPRESSED OR HOPELESS: NOT AT ALL

## 2024-01-18 ASSESSMENT — PAIN DESCRIPTION - DESCRIPTORS: DESCRIPTORS: SHOOTING;SHARP

## 2024-01-18 ASSESSMENT — PAIN - FUNCTIONAL ASSESSMENT: PAIN_FUNCTIONAL_ASSESSMENT: 0-10

## 2024-01-18 NOTE — PROGRESS NOTES
MEDICATION NAME: hydrocodone/ acetaminophen  STRENGTH: 5-325 mg  LAST FILL DATE: 2023  DATE LAST TAKEN: 2024  QUANTITY FILLED: 90  QUANTITY REMAININ  COUNT COMPLETED BY: GHASSAN LOFTON RN and ZULMA Boyle    CONTROLLED SUBSTANCES AGREEMENT LAST SIGNED: 2024  ORT LAST COMPLETED:2023  Modified [employer] disability form filled out annually.       [id number]

## 2024-01-18 NOTE — PROGRESS NOTES
Subjective   Patient ID: Kika Amor is a 89 y.o. female who presents for Back Pain.  Patient is an 89-year-old female with bilateral knee severe osteoarthritis lumbosacral radiculopathy degenerative de ~vertebral disc displacement 6 spine pain with spasms history of a renal mass macular degeneration presents today for follow-up.  Patient did notes that she will be seeing orthopedic that potentially will be providing her steroidal injections in her knees.  She does note that that since the temperature have dropped freezing she has been having increasing symptoms.  She understands that this is related to osteoarthritis she has done some considerations potentially moving to warmer weather but her family is located here    Back Pain  Pertinent negatives include no chest pain or fever.       Review of Systems   Constitutional:  Negative for activity change, chills, fatigue, fever and unexpected weight change.   HENT:  Negative for ear pain, sore throat and voice change.    Eyes:  Negative for visual disturbance.   Respiratory:  Negative for cough, chest tightness and shortness of breath.    Cardiovascular:  Negative for chest pain and palpitations.   Gastrointestinal:  Negative for diarrhea, nausea and vomiting.   Musculoskeletal:  Positive for arthralgias, back pain, gait problem and joint swelling.   Psychiatric/Behavioral:  Negative for behavioral problems, self-injury, sleep disturbance and suicidal ideas.        Objective   Physical Exam  Vitals reviewed.   Constitutional:       Appearance: Normal appearance.   HENT:      Head: Normocephalic and atraumatic.      Mouth/Throat:      Mouth: Mucous membranes are moist.   Neck:      Vascular: No JVD.   Pulmonary:      Effort: Pulmonary effort is normal. No tachypnea or bradypnea.   Abdominal:      Palpations: Abdomen is soft.   Musculoskeletal:      Thoracic back: Tenderness present.      Lumbar back: Tenderness present. Decreased range of motion. Positive left  straight leg raise test. Negative right straight leg raise test.        Back:       Right knee: Swelling and crepitus present. Decreased range of motion. Tenderness present over the medial joint line and lateral joint line.      Left knee: Crepitus present. Decreased range of motion. Tenderness present over the medial joint line and lateral joint line.   Skin:     General: Skin is warm and dry.   Neurological:      Mental Status: She is alert and oriented to person, place, and time.   Psychiatric:         Mood and Affect: Mood normal.         Behavior: Behavior normal. Behavior is cooperative.       Assessment/Plan   Problem List Items Addressed This Visit             ICD-10-CM    Degeneration of lumbar intervertebral disc M51.36    Lumbar radiculopathy M54.16    Primary osteoarthritis of knees, bilateral - Primary M17.0     I had nice discussion with the patient today our plan will be as follows.      Radiology: [ none at this time ]      Physically:  [ continue modification of activities, healthy lifestyle choice ]      Psychologically:  [ No acute psychological concerns ]      Medication: [I will refill the medications at the same dose and frequency. We will continue to monitor the patient bimonthly for compliance, adverse reaction or interations The patient continues to see benefit and improvement in their quality of life and ability to maintain ADLs. Patient educated about the risks of taking opioids and operating a motor vehicle. Patient reports no adverse side effects to current medication regimen. Current regimen does allow patient to maintain ADLs. Oarrs has been reviewed. No suspicion of diversion or abuse. Compliance with medication regime, no use of illicit drugs, no sharing of narcotic medications with others, do not use others narcotic medication, and to avoid alcohol use. Patient has been educated on the risks, benefits, and alternatives of controlled substances as well as the proper way to store  these medications.   The patient and I discussed the nature of this medication and its side effects. We discussed tolerance, physical dependence, psychological dependence, addiction and opioid-induced hyperalgesia  Pt to submit sample for tox screen]      Duration:  [ 2 months ]      Intervention:  [ none at this time. Patient is stable.  ]         Steve Huffman PA-C 01/18/24 2:54 PM

## 2024-01-19 LAB
AMPHETAMINES UR QL SCN: NORMAL
BARBITURATES UR QL SCN: NORMAL
BZE UR QL SCN: NORMAL
CANNABINOIDS UR QL SCN: NORMAL
CREAT UR-MCNC: 194.6 MG/DL (ref 20–320)
PCP UR QL SCN: NORMAL

## 2024-01-23 LAB
1OH-MIDAZOLAM UR CFM-MCNC: <25 NG/ML
6MAM UR CFM-MCNC: <25 NG/ML
7AMINOCLONAZEPAM UR CFM-MCNC: <25 NG/ML
A-OH ALPRAZ UR CFM-MCNC: >1000 NG/ML
ALPRAZ UR CFM-MCNC: 778 NG/ML
CHLORDIAZEP UR CFM-MCNC: <25 NG/ML
CLONAZEPAM UR CFM-MCNC: <25 NG/ML
CODEINE UR CFM-MCNC: <50 NG/ML
DIAZEPAM UR CFM-MCNC: <25 NG/ML
EDDP UR CFM-MCNC: <25 NG/ML
FENTANYL UR CFM-MCNC: <2.5 NG/ML
HYDROCODONE CTO UR CFM-MCNC: >2500 NG/ML
HYDROMORPHONE UR CFM-MCNC: 1879 NG/ML
LORAZEPAM UR CFM-MCNC: <25 NG/ML
METHADONE UR CFM-MCNC: <25 NG/ML
MIDAZOLAM UR CFM-MCNC: <25 NG/ML
MORPHINE UR CFM-MCNC: <50 NG/ML
NORDIAZEPAM UR CFM-MCNC: <25 NG/ML
NORFENTANYL UR CFM-MCNC: <2.5 NG/ML
NORHYDROCODONE UR CFM-MCNC: >1000 NG/ML
NOROXYCODONE UR CFM-MCNC: <25 NG/ML
NORTRAMADOL UR-MCNC: <50 NG/ML
OXAZEPAM UR CFM-MCNC: <25 NG/ML
OXYCODONE UR CFM-MCNC: <25 NG/ML
OXYMORPHONE UR CFM-MCNC: <25 NG/ML
TEMAZEPAM UR CFM-MCNC: <25 NG/ML
TRAMADOL UR CFM-MCNC: <50 NG/ML
ZOLPIDEM UR CFM-MCNC: <25 NG/ML
ZOLPIDEM UR-MCNC: <25 NG/ML

## 2024-02-20 ENCOUNTER — APPOINTMENT (OUTPATIENT)
Dept: UROLOGY | Facility: CLINIC | Age: 89
End: 2024-02-20
Payer: MEDICARE

## 2024-02-20 NOTE — PROGRESS NOTES
Subjective   Patient ID: Kika Amor is a 89 y.o. female who presents for pessary maintenance.    HPI  88-year-old with history of #4 cube pessary, stable 3 cm simple adnexal cyst, history of bilateral kidney cysts, significant vaginal atrophy and weakness with prolapsing hemorrhoids, lower extremity hardware preventing external rotation of the bilateral hip joints, presenting for a 2.75 inch Gellhorn short stem placed 5/16/2023    From Previous note  88-year-old with history of #4 cube pessary, stable 3 cm simple adnexal cyst, history of bilateral kidney cysts, significant vaginal atrophy and weakness with prolapsing hemorrhoids, lower extremity hardware preventing external rotation of the bilateral hip joints, presenting with a 2.75 inch Gellhorn short stem placed 5/16/2023    The patient appears to be extremely satisfied with the pessary, she denies any bugle complaints. She denies any vaginal complaints, no discharge.  She is utilizing the vaginal estrogen cream .    Ultrasound pelvis redemonstrated  previously-seen right ovarian cyst, now  measuring 3.3 x 2.9 x 2.7 cm, previously measuring 3.4 x 3.2 x 2.2 cm. She is noting resolution of her pain complaints.    She denies any lower urinary tract complaints. She denies any UTI like symptoms.     She denies any bowel related complaints, no fecal or flatal incontinence.    She has no other complaints.    From Previous note  88-year-old with history of #4 cube pessary, stable 3 cm simple adnexal cyst, history of bilateral kidney cysts, significant vaginal atrophy and weakness with prolapsing hemorrhoids, lower extremity hardware preventing external rotation of the bilateral hip joints, presenting for a 2.75 inch Gellhorn short stem pessary refitting.     The patient appears to be extremely satisfied with the pessary, she denies any bugle complaints. She reports pain associated with her right ovarian cysts, pelvic ultrasound form 1/27/2023 demonstrates 3.4 cm  left ovarian cyst. According to her report while undergoing a kidney ultrasound the radiologist evaluated her pelvis and it was extremely painful, there is no official report of this.    She denies any lower urinary tract complaints. She denies any UTI like symptoms.     She denies any bowel related complaints, no fecal or flatal incontinence.    She has no other complaints.    From Previous note  88-year-old with history of #4 cube pessary, stable 3 cm simple adnexal cyst, history of bilateral kidney cysts, significant vaginal atrophy and weakness with prolapsing hemorrhoids, lower extremity hardware preventing external rotation of the bilateral hip joints, presenting for a 2.75 inch Gellhorn short stem pessary refitting.      The patient s continues to note and is bothered by the bulge with her present 2.5 inch Gellhorn short stem in place.She states she is able to void in the morning but notes that its bothersome when she moves around. Denies any bowel changes with the pessary in place     She has no other complaints.     From previous note  88-year-old with history of #4 cube pessary, stable 3 cm simple adnexal cyst, history of bilateral kidney cysts, significant vaginal atrophy and weakness with prolapsing hemorrhoids presenting for pessary follow-up.     The patient s continues to note and is bothered by the bulge. She believes that the pessary was exposed but on exam it is in place. She also complaints of sensation of incomplete bladder emptying. She states she is able to void in the morning but notes that its bothersome when she moves around. Surgical correction was discussed at length. She is not utilizing the vaginal estrogen cream. however she notes bleeding.     She denies any bowel related complaints, no fecal or flatal incontinence.     She has no other complaints.     From previous note  88-year-old with history of #4 cube pessary, stable 3 cm simple adnexal cyst, history of bilateral kidney cysts,  "significant vaginal atrophy and weakness with prolapsing hemorrhoids having undergone acute pessary removal and pessary refitting on 2/9/2023.     The patient presents today with discomfort associated with this pessary, she states it is coming down and she is feeling the bulge despite the pessary being in place.      She denies any bowel related complaints, no fecal or flatal incontinence.     She has no other complaints.     From previous note  88-year-old with cube pessary in place, history of adnexal cyst on July 2022 [facility] ultrasound, history of bilateral kidney cysts, significant vaginal atrophy and weakness with prolapsing hemorrhoids presenting today for pessary refitting.     She has no other complaints.         From previous note  88-year-old presenting as a referral from Dr. Sophia Boyce with complaints of urinary urgency, kidney mass and history of gross hematuria with pessary in place.     The patient was previously seen by Dr. Sophia Boyce for kidney mass and gross hematuria with a benign cystoscopy performed 12/5/2022. She also has a pessary in place which Dr. Miles replaced with a smaller size and is working better. Most recent pessary maintenance was performed earlier in January. She states that this is a cube. She is having this removed and replaced every 2 months. She denies and bleeding except for the when the pessary is being manipulated. She also complaints of urinary urgency, she notes 1-2 episodes of nocturia but otherwise denies any significant daytime frequency or urgency. She wears a pad to avoid accidents but she does not leak unless she is \"bending over\". She denies any history of stress urinary incontinence.      She is not sexually active and was noted to have a 3 cm ovarian cyst in July 2022 on [facility] imaging. She denies any vaginal complaints, no abnormal vaginal bleeding or discharge.     She has a regular soft bowel movement, She denies any bowel related complaints, no fecal or flatal " incontinence.     She has no other complaints.      Review of Systems  Constitutional: No fever, No chills and No fatigue.   Eyes: No vision problems and No dryness of the eyes.   ENT: No dry mouth, No hearing loss and No nosebleeds.   Cardiovascular: No chest pain, No palpitations and No orthopnea.   Respiratory: No shortness of breath, No cough and No wheezing.   Gastrointestinal: No abdominal pain, No constipation, No nausea, No diarrhea, No vomiting and No melena.   Genitourinary: As noted in HPI.   Musculoskeletal: No back pain, No myalgias, No muscle weakness, No joint swelling and No leg edema.   Integumentary: No rashes, No skin lesion and No itching.   Neurological: No headache, No numbness and No dizziness.   Psychiatric: No sleep disturbances, No anxiety and No depression.   Endocrine: No hot flashes, No loss of hair and No hirsutism.   Hematologic/Lymphatic: No swollen glands, No tendency for easy bleeding and No tendency for easy bruising.   All other systems have been reviewed and are negative for complaint.        Objective   Physical Exam  Patient ID: Kika Amor is a 89 y.o. female.    Procedures  FPMRS Procedure:  New placement of pessary and Pessary removal.   Indication (s): pelvic organ prolapse.   Findings include: cystocele, atrophy and A 2.5 inch Gellhorn short stem pessary was noted to be in place. This was removed with some difficulty and replaced with a 2.75 inch Gellhorn short stem.  Lidocaine gel was used.  Pessary Placement: KIKA was fitted with 2.75 inch Gellhorn short stem pessary.        Assessment/Plan      88-year-old with history of #4 cube pessary, stable 3 cm simple adnexal cyst, history of bilateral kidney cysts, significant vaginal atrophy and weakness with prolapsing hemorrhoids, lower extremity hardware preventing external rotation of the bilateral hip joints, presenting for a 2.75 inch Gellhorn short stem placed 5/16/2023     #1 the patient is to continue to  follow-up with Dr. Sheppard. Recent ultrasound imaging 11/2/2022 demonstrated a solid 2.4 cm mass of the right pole and several cysts of the bilateral kidneys measuring up to 3 to 4 cm. She has had multiple imaging performed through [facility]. Patient will have yearly follow up with ultrasound per his recommendations.     #2 we discussed her previous imaging performed in July 2022 demonstrating a 3 cm ovarian cyst. Repeat pelvic ultrasound 1/27/2023 demonstrates stable simple 3 cm ovarian cyst. She states that during her recent Kidney ultrasound her ultrasonographer imaged her right lower quadrant which caused significant  discomfort following evaluation.  This pain is significantly improved.  No further imaging is indicated at this time.     3. Patient's cube pessary was removed 2/9/2023 with excoriations and discharge noted and the patient was refitted with a 2.25 inch Gellhorn short stem. The patient was dissatisfied with this and it was refitted 3/7/2023 with a 2.5 inch Gellhorn short stem. She states that she continues to note vaginal bulging around this pessary when on her feet. She denies any pain noted some difficulty with urination. She was refitted with a 2.75 inch Gellhorn short stem 5/16/2023. We did discuss at length potential for surgical options. She is a candidate for a sacrospinous ligament suspension but she has significantly reduced external rotation of her bilateral hip joints. However there is access vaginally. We did briefly discussed the risks of bleeding, infection, damage surrounding tissues, postoperative restrictions, and success. Continue her Gellhorn pessary moving forward.  There was some difficulty in removing and replacing the pessary today 10/17/2023 and we will readdress the need for possible refitting with a 2.5 inch Gellhorn short stem at her follow-up visits.     4. The patient is unbothered by her hemorrhoids. We discussed the importance of fiber therapy daily to improve her bowel  movement constipation complaints.     5. She will continue her vaginal estrogen therapy 3 times a week moving forward.     #6 We will follow-up in February/March 2024 for pessary maintenance.     YADIRA Youssef MD     Scribe Attestation  By signing my name below, IDanae Scribe attest that this documentation has been prepared under the direction and in the presence of Sang Youssef MD. All medical record entries made by the Scribe were at my direction or personally dictated by me. I have reviewed the chart and agree that the record accurately reflects my personal performance of the history, physical exam, discussion and plan.

## 2024-02-27 ENCOUNTER — OFFICE VISIT (OUTPATIENT)
Dept: UROLOGY | Facility: CLINIC | Age: 89
End: 2024-02-27
Payer: MEDICARE

## 2024-02-27 VITALS — WEIGHT: 119.1 LBS | BODY MASS INDEX: 21.78 KG/M2

## 2024-02-27 DIAGNOSIS — Z46.89 ENCOUNTER FOR FITTING AND ADJUSTMENT OF PESSARY: ICD-10-CM

## 2024-02-27 DIAGNOSIS — Z46.89 PESSARY MAINTENANCE: ICD-10-CM

## 2024-02-27 DIAGNOSIS — Z96.0 PRESENCE OF PESSARY: ICD-10-CM

## 2024-02-27 DIAGNOSIS — N81.10 VAGINAL PROLAPSE: ICD-10-CM

## 2024-02-27 DIAGNOSIS — N95.2 ATROPHIC VAGINITIS: ICD-10-CM

## 2024-02-27 DIAGNOSIS — N36.2 URETHRAL CARUNCLE: Primary | ICD-10-CM

## 2024-02-27 PROCEDURE — 57160 INSERT PESSARY/OTHER DEVICE: CPT | Performed by: OBSTETRICS & GYNECOLOGY

## 2024-02-27 PROCEDURE — 1125F AMNT PAIN NOTED PAIN PRSNT: CPT | Performed by: OBSTETRICS & GYNECOLOGY

## 2024-02-27 PROCEDURE — 99214 OFFICE O/P EST MOD 30 MIN: CPT | Performed by: OBSTETRICS & GYNECOLOGY

## 2024-02-27 PROCEDURE — 99214 OFFICE O/P EST MOD 30 MIN: CPT | Mod: 25 | Performed by: OBSTETRICS & GYNECOLOGY

## 2024-02-27 PROCEDURE — 1159F MED LIST DOCD IN RCRD: CPT | Performed by: OBSTETRICS & GYNECOLOGY

## 2024-02-27 PROCEDURE — 1160F RVW MEDS BY RX/DR IN RCRD: CPT | Performed by: OBSTETRICS & GYNECOLOGY

## 2024-02-27 PROCEDURE — 1036F TOBACCO NON-USER: CPT | Performed by: OBSTETRICS & GYNECOLOGY

## 2024-02-27 NOTE — PATIENT INSTRUCTIONS
Please continue her vaginal estrogen therapy 2-3 times a week.    Please follow-up in June/July 2024 for pessary maintenance.    Please follow-up with Dr. Sheppard per his recommendations.    Please contact the clinic with any questions or concerns.    [phone]

## 2024-02-27 NOTE — PROGRESS NOTES
Subjective   Patient ID: Kika Amor is a 89 y.o. female who presents for pessary maintenance.    HPI  88-year-old with history of #4 cube pessary, stable 3 cm simple adnexal cyst, history of bilateral kidney cysts, significant vaginal atrophy and weakness with prolapsing hemorrhoids, lower extremity hardware preventing external rotation of the bilateral hip joints, presenting with 2.75 inch Gellhorn short stem originally placed 5/16/2023      The patient appears to be satisfied with the pessary, she denies any bugle complaints. She denies any vaginal complaints, no discharge.  She is utilizing the vaginal estrogen cream .    She  did note leaking when she bend over but this has improved since, she denies any lower urinary tract complaints. She denies any UTI like symptoms.     She does note bleeding hemorrhoids, no fecal or flatal incontinence.    She has no other complaints.    From Previous note  88-year-old with history of #4 cube pessary, stable 3 cm simple adnexal cyst, history of bilateral kidney cysts, significant vaginal atrophy and weakness with prolapsing hemorrhoids, lower extremity hardware preventing external rotation of the bilateral hip joints, presenting with a 2.75 inch Gellhorn short stem placed 5/16/2023    The patient appears to be extremely satisfied with the pessary, she denies any bugle complaints. She denies any vaginal complaints, no discharge.  She is utilizing the vaginal estrogen cream .    Ultrasound pelvis redemonstrated  previously-seen right ovarian cyst, now  measuring 3.3 x 2.9 x 2.7 cm, previously measuring 3.4 x 3.2 x 2.2 cm. She is noting resolution of her pain complaints.    She denies any lower urinary tract complaints. She denies any UTI like symptoms.     She denies any bowel related complaints, no fecal or flatal incontinence.    She has no other complaints.    From Previous note  88-year-old with history of #4 cube pessary, stable 3 cm simple adnexal cyst, history of  bilateral kidney cysts, significant vaginal atrophy and weakness with prolapsing hemorrhoids, lower extremity hardware preventing external rotation of the bilateral hip joints, presenting for a 2.75 inch Gellhorn short stem pessary refitting.     The patient appears to be extremely satisfied with the pessary, she denies any bugle complaints. She reports pain associated with her right ovarian cysts, pelvic ultrasound form 1/27/2023 demonstrates 3.4 cm left ovarian cyst. According to her report while undergoing a kidney ultrasound the radiologist evaluated her pelvis and it was extremely painful, there is no official report of this.    She denies any lower urinary tract complaints. She denies any UTI like symptoms.     She denies any bowel related complaints, no fecal or flatal incontinence.    She has no other complaints.    From Previous note  88-year-old with history of #4 cube pessary, stable 3 cm simple adnexal cyst, history of bilateral kidney cysts, significant vaginal atrophy and weakness with prolapsing hemorrhoids, lower extremity hardware preventing external rotation of the bilateral hip joints, presenting for a 2.75 inch Gellhorn short stem pessary refitting.      The patient s continues to note and is bothered by the bulge with her present 2.5 inch Gellhorn short stem in place.She states she is able to void in the morning but notes that its bothersome when she moves around. Denies any bowel changes with the pessary in place     She has no other complaints.     From previous note  88-year-old with history of #4 cube pessary, stable 3 cm simple adnexal cyst, history of bilateral kidney cysts, significant vaginal atrophy and weakness with prolapsing hemorrhoids presenting for pessary follow-up.     The patient s continues to note and is bothered by the bulge. She believes that the pessary was exposed but on exam it is in place. She also complaints of sensation of incomplete bladder emptying. She states she  is able to void in the morning but notes that its bothersome when she moves around. Surgical correction was discussed at length. She is not utilizing the vaginal estrogen cream. however she notes bleeding.     She denies any bowel related complaints, no fecal or flatal incontinence.     She has no other complaints.     From previous note  88-year-old with history of #4 cube pessary, stable 3 cm simple adnexal cyst, history of bilateral kidney cysts, significant vaginal atrophy and weakness with prolapsing hemorrhoids having undergone acute pessary removal and pessary refitting on 2/9/2023.     The patient presents today with discomfort associated with this pessary, she states it is coming down and she is feeling the bulge despite the pessary being in place.      She denies any bowel related complaints, no fecal or flatal incontinence.     She has no other complaints.     From previous note  88-year-old with cube pessary in place, history of adnexal cyst on July 2022 [facility] ultrasound, history of bilateral kidney cysts, significant vaginal atrophy and weakness with prolapsing hemorrhoids presenting today for pessary refitting.     She has no other complaints.         From previous note  88-year-old presenting as a referral from Dr. Sophia Boyce with complaints of urinary urgency, kidney mass and history of gross hematuria with pessary in place.     The patient was previously seen by Dr. Sophia Boyce for kidney mass and gross hematuria with a benign cystoscopy performed 12/5/2022. She also has a pessary in place which Dr. Miles replaced with a smaller size and is working better. Most recent pessary maintenance was performed earlier in January. She states that this is a cube. She is having this removed and replaced every 2 months. She denies and bleeding except for the when the pessary is being manipulated. She also complaints of urinary urgency, she notes 1-2 episodes of nocturia but otherwise denies any significant  "daytime frequency or urgency. She wears a pad to avoid accidents but she does not leak unless she is \"bending over\". She denies any history of stress urinary incontinence.      She is not sexually active and was noted to have a 3 cm ovarian cyst in July 2022 on [facility] imaging. She denies any vaginal complaints, no abnormal vaginal bleeding or discharge.     She has a regular soft bowel movement, She denies any bowel related complaints, no fecal or flatal incontinence.     She has no other complaints.      Review of Systems  Constitutional: No fever, No chills and No fatigue.   Eyes: No vision problems and No dryness of the eyes.   ENT: No dry mouth, No hearing loss and No nosebleeds.   Cardiovascular: No chest pain, No palpitations and No orthopnea.   Respiratory: No shortness of breath, No cough and No wheezing.   Gastrointestinal: No abdominal pain, No constipation, No nausea, No diarrhea, No vomiting and No melena.   Genitourinary: As noted in HPI.   Musculoskeletal: No back pain, No myalgias, No muscle weakness, No joint swelling and No leg edema.   Integumentary: No rashes, No skin lesion and No itching.   Neurological: No headache, No numbness and No dizziness.   Psychiatric: No sleep disturbances, No anxiety and No depression.   Endocrine: No hot flashes, No loss of hair and No hirsutism.   Hematologic/Lymphatic: No swollen glands, No tendency for easy bleeding and No tendency for easy bruising.   All other systems have been reviewed and are negative for complaint.        Objective   Physical Exam  Patient ID: Kika Amor is a 89 y.o. female.    Procedures  FPMRS Procedure:  New placement of pessary and Pessary removal.   Indication (s): pelvic organ prolapse.   Findings include: cystocele, atrophy and A 2.75 inch Gellhorn short stem pessary was noted to be in place. This was removed with some difficulty and replaced with a 2.5 inch Gellhorn short stem.  Lidocaine gel was used.  Pessary Placement: KIKA" was fitted with 2.5 inch Gellhorn short stem pessary.        Assessment/Plan      88-year-old with history of #4 cube pessary, stable 3 cm simple adnexal cyst, history of bilateral kidney cysts, significant vaginal atrophy and weakness with prolapsing hemorrhoids, lower extremity hardware preventing external rotation of the bilateral hip joints, presenting for pessary refitting from a 2.75 inch Gellhorn short stem placed 5/16/2023 to a 2.5 inch Gellhorn short stem today 2/27/2024.     #1 the patient is to continue to follow-up with Dr. Sheppard.  She is known to have a solid 2.4 cm mass of the right pole and several cysts of the bilateral kidneys measuring up to 3 to 4 cm. She has had multiple imaging performed through [facility]. Patient will have yearly follow up with ultrasound per his recommendations.     #2 we discussed her previous imaging performed in July 2022 demonstrating a 3 cm ovarian cyst. Repeat pelvic ultrasound 1/27/2023 demonstrates stable simple 3 cm ovarian cyst. She states that during her recent Kidney ultrasound her ultrasonographer imaged her right lower quadrant which caused significant  discomfort following evaluation.  This pain is significantly improved.  No further imaging is indicated at this time.     3. Patient's cube pessary was removed 2/9/2023 with excoriations and discharge noted and the patient was refitted with a 2.25 inch Gellhorn short stem. The patient was dissatisfied with this and it was refitted 3/7/2023 with a 2.5 inch Gellhorn short stem.  She noted vaginal bulging around this pessary when on her feet. She denies any pain noted some difficulty with urination. She was refitted with a 2.75 inch Gellhorn short stem 5/16/2023. We did discuss at length potential for surgical options. She is a candidate for a sacrospinous ligament suspension but she has significantly reduced external rotation of her bilateral hip joints. However there is access vaginally. We did briefly discussed the  risks of bleeding, infection, damage surrounding tissues, postoperative restrictions, and success. Continue her Gellhorn pessary moving forward.  There was some difficulty in removing and replacing the pessary 10/17/2023 and today 2/27/2024.  She was therefore refitted with a 2.5 inch Gellhorn short stem.      4. The patient is unbothered by her hemorrhoids. We discussed the importance of fiber therapy daily to improve her bowel movement constipation complaints.     5. She will continue her vaginal estrogen therapy 3 times a week moving forward.     #6 We will follow-up in June/July 2024 for pessary maintenance.  She will contact the clinic should she have issues with her smaller 2.5 inch Gellhorn short stem pessary.     YADIRA Youssef MD     Scribe Attestation  By signing my name below, IDanae Scribe attest that this documentation has been prepared under the direction and in the presence of Sang Youssef MD. All medical record entries made by the Scribe were at my direction or personally dictated by me. I have reviewed the chart and agree that the record accurately reflects my personal performance of the history, physical exam, discussion and plan.

## 2024-03-20 ENCOUNTER — OFFICE VISIT (OUTPATIENT)
Dept: PAIN MEDICINE | Facility: CLINIC | Age: 89
End: 2024-03-20
Payer: MEDICARE

## 2024-03-20 VITALS
HEART RATE: 61 BPM | HEIGHT: 62 IN | SYSTOLIC BLOOD PRESSURE: 152 MMHG | WEIGHT: 119 LBS | DIASTOLIC BLOOD PRESSURE: 74 MMHG | BODY MASS INDEX: 21.9 KG/M2 | RESPIRATION RATE: 20 BRPM

## 2024-03-20 DIAGNOSIS — M51.36 DEGENERATION OF LUMBAR INTERVERTEBRAL DISC: ICD-10-CM

## 2024-03-20 DIAGNOSIS — M17.0 PRIMARY OSTEOARTHRITIS OF KNEES, BILATERAL: Primary | ICD-10-CM

## 2024-03-20 DIAGNOSIS — M54.16 LUMBAR RADICULOPATHY: ICD-10-CM

## 2024-03-20 PROCEDURE — 1159F MED LIST DOCD IN RCRD: CPT | Performed by: PHYSICIAN ASSISTANT

## 2024-03-20 PROCEDURE — 99214 OFFICE O/P EST MOD 30 MIN: CPT | Performed by: PHYSICIAN ASSISTANT

## 2024-03-20 PROCEDURE — 1036F TOBACCO NON-USER: CPT | Performed by: PHYSICIAN ASSISTANT

## 2024-03-20 PROCEDURE — 1125F AMNT PAIN NOTED PAIN PRSNT: CPT | Performed by: PHYSICIAN ASSISTANT

## 2024-03-20 PROCEDURE — 1160F RVW MEDS BY RX/DR IN RCRD: CPT | Performed by: PHYSICIAN ASSISTANT

## 2024-03-20 RX ORDER — HYDROCODONE BITARTRATE AND ACETAMINOPHEN 5; 325 MG/1; MG/1
1 TABLET ORAL EVERY 8 HOURS PRN
Qty: 90 TABLET | Refills: 0 | Status: SHIPPED | OUTPATIENT
Start: 2024-03-20 | End: 2024-05-24 | Stop reason: SDUPTHER

## 2024-03-20 RX ORDER — HYDROCODONE BITARTRATE AND ACETAMINOPHEN 5; 325 MG/1; MG/1
1 TABLET ORAL EVERY 4 HOURS PRN
COMMUNITY
Start: 2024-02-24 | End: 2024-03-20 | Stop reason: WASHOUT

## 2024-03-20 ASSESSMENT — PATIENT HEALTH QUESTIONNAIRE - PHQ9
SUM OF ALL RESPONSES TO PHQ9 QUESTIONS 1 & 2: 0
2. FEELING DOWN, DEPRESSED OR HOPELESS: NOT AT ALL
1. LITTLE INTEREST OR PLEASURE IN DOING THINGS: NOT AT ALL

## 2024-03-20 ASSESSMENT — ENCOUNTER SYMPTOMS
FATIGUE: 0
FEVER: 0
PALPITATIONS: 0
BACK PAIN: 1
SHORTNESS OF BREATH: 0
VOMITING: 0
ACTIVITY CHANGE: 0
SLEEP DISTURBANCE: 0
CHILLS: 0
DIARRHEA: 0
COUGH: 0
ARTHRALGIAS: 1
VOICE CHANGE: 0
NAUSEA: 0
CHEST TIGHTNESS: 0
JOINT SWELLING: 1
SORE THROAT: 0
UNEXPECTED WEIGHT CHANGE: 0

## 2024-03-20 ASSESSMENT — LIFESTYLE VARIABLES
HOW MANY STANDARD DRINKS CONTAINING ALCOHOL DO YOU HAVE ON A TYPICAL DAY: PATIENT DOES NOT DRINK
AUDIT-C TOTAL SCORE: 0
HOW OFTEN DO YOU HAVE A DRINK CONTAINING ALCOHOL: NEVER
HOW OFTEN DO YOU HAVE SIX OR MORE DRINKS ON ONE OCCASION: NEVER
SKIP TO QUESTIONS 9-10: 1

## 2024-03-20 ASSESSMENT — PAIN - FUNCTIONAL ASSESSMENT: PAIN_FUNCTIONAL_ASSESSMENT: 0-10

## 2024-03-20 ASSESSMENT — PAIN SCALES - GENERAL
PAINLEVEL: 7
PAINLEVEL_OUTOF10: 7

## 2024-03-20 ASSESSMENT — PAIN DESCRIPTION - DESCRIPTORS: DESCRIPTORS: ACHING

## 2024-03-20 NOTE — PROGRESS NOTES
SPO2 [id number]    MEDICATION NAME: Hydrocodone   STRENGTH: 5-325  LAST FILL DATE: 24  DATE LAST TAKEN: 3/20/24  QUANTITY FILLED: 90  QUANTITY REMAININ  COUNT COMPLETED BY: ARLIN ACUÑA and Eitan RN

## 2024-03-20 NOTE — PROGRESS NOTES
Subjective   Patient ID: Kika Amor is a 89 y.o. female who presents for Back Pain (Back and knees).  Patient is an 89-year-old female with bilateral knee osteoarthritis degenerative lumbar disc disease with radiculopathy.  Patient has a history of eye problems  and renal mass.  Patient is continually getting injections in her eyes to treat her eye disorders.  She is continuing to follow-up with urology/neph with a monitoring of her renal mass.  Did notes that her knee pain has been getting worse she has been utilizing patches on her back that have been beneficial.  She did notes that her knees are stiff achy sometimes Grinding pain the weather patterns right now are causing lower symptoms.  Patient's work wondering if she can have a referral to Dr. Guaman her prior physician that used to be at [facility] steroid injections    Back Pain  Pertinent negatives include no chest pain or fever.       Review of Systems   Constitutional:  Negative for activity change, chills, fatigue, fever and unexpected weight change.   HENT:  Negative for ear pain, sore throat and voice change.    Eyes:  Negative for visual disturbance.   Respiratory:  Negative for cough, chest tightness and shortness of breath.    Cardiovascular:  Negative for chest pain and palpitations.   Gastrointestinal:  Negative for diarrhea, nausea and vomiting.   Musculoskeletal:  Positive for arthralgias, back pain, gait problem and joint swelling.   Psychiatric/Behavioral:  Negative for behavioral problems, self-injury, sleep disturbance and suicidal ideas.        Objective   Physical Exam  Vitals reviewed.   Constitutional:       Appearance: Normal appearance.   HENT:      Head: Normocephalic and atraumatic.      Mouth/Throat:      Mouth: Mucous membranes are moist.   Neck:      Vascular: No JVD.   Pulmonary:      Effort: Pulmonary effort is normal. No tachypnea or bradypnea.   Abdominal:      Palpations: Abdomen is soft.   Musculoskeletal:       Thoracic back: Tenderness present.      Lumbar back: Tenderness present. Decreased range of motion. Positive left straight leg raise test. Negative right straight leg raise test.        Back:       Right knee: Swelling and crepitus present. Decreased range of motion. Tenderness present over the medial joint line and lateral joint line.      Left knee: Crepitus present. Decreased range of motion. Tenderness present over the medial joint line and lateral joint line.   Skin:     General: Skin is warm and dry.   Neurological:      Mental Status: She is alert and oriented to person, place, and time.   Psychiatric:         Mood and Affect: Mood normal.         Behavior: Behavior normal. Behavior is cooperative.       Assessment/Plan   Problem List Items Addressed This Visit             ICD-10-CM    Degeneration of lumbar intervertebral disc M51.36    Lumbar radiculopathy M54.16    Primary osteoarthritis of knees, bilateral - Primary M17.0     I had nice discussion with the patient today our plan will be as follows.      Radiology: [ none at this time ]      Physically:  [ continue modification of activities, healthy lifestyle choice ]      Psychologically:  [ No acute psychological concerns ]      Medication: [I will refill the medications at the same dose and frequency. We will continue to monitor the patient bimonthly for compliance, adverse reaction or interations The patient continues to see benefit and improvement in their quality of life and ability to maintain ADLs. Patient educated about the risks of taking opioids and operating a motor vehicle. Patient reports no adverse side effects to current medication regimen. Current regimen does allow patient to maintain ADLs. Oarrs has been reviewed. No suspicion of diversion or abuse. Compliance with medication regime, no use of illicit drugs, no sharing of narcotic medications with others, do not use others narcotic medication, and to avoid alcohol use. Patient has been  educated on the risks, benefits, and alternatives of controlled substances as well as the proper way to store these medications.   The patient and I discussed the nature of this medication and its side effects. We discussed tolerance, physical dependence, psychological dependence, addiction and opioid-induced hyperalgesia ]      Duration:  [ 2 months ]      Intervention:  [I help patient online set up an appointment request for the [facility] orthopedic department with Dr. Emery Rodriguez.  She should be receiving a confirmation email if patient has additional issues or concerns she can contact my office and I will do my best to help patient get into see this physician.  With patient's severe DJD of the knees and her other health conditions I do not believe that surgery is going to be an interventional option.  Hopefully steroidal injections will provide patient with some additional relief of her symptoms.]         Steve Huffman PA-C 03/20/24 2:35 PM

## 2024-04-05 ENCOUNTER — HOSPITAL ENCOUNTER (OUTPATIENT)
Dept: RADIOLOGY | Facility: CLINIC | Age: 89
Discharge: HOME | End: 2024-04-05
Payer: MEDICARE

## 2024-04-05 DIAGNOSIS — N28.89 OTHER SPECIFIED DISORDERS OF KIDNEY AND URETER: ICD-10-CM

## 2024-04-05 PROCEDURE — 76770 US EXAM ABDO BACK WALL COMP: CPT | Performed by: STUDENT IN AN ORGANIZED HEALTH CARE EDUCATION/TRAINING PROGRAM

## 2024-04-05 PROCEDURE — 76770 US EXAM ABDO BACK WALL COMP: CPT

## 2024-05-22 ENCOUNTER — APPOINTMENT (OUTPATIENT)
Dept: PAIN MEDICINE | Facility: CLINIC | Age: 89
End: 2024-05-22
Payer: MEDICARE

## 2024-05-24 ENCOUNTER — OFFICE VISIT (OUTPATIENT)
Dept: PAIN MEDICINE | Facility: CLINIC | Age: 89
End: 2024-05-24
Payer: MEDICARE

## 2024-05-24 VITALS
HEART RATE: 59 BPM | HEIGHT: 62 IN | WEIGHT: 119 LBS | OXYGEN SATURATION: 97 % | BODY MASS INDEX: 21.9 KG/M2 | SYSTOLIC BLOOD PRESSURE: 132 MMHG | DIASTOLIC BLOOD PRESSURE: 60 MMHG | RESPIRATION RATE: 18 BRPM

## 2024-05-24 DIAGNOSIS — M17.0 PRIMARY OSTEOARTHRITIS OF KNEES, BILATERAL: ICD-10-CM

## 2024-05-24 DIAGNOSIS — M54.16 LUMBAR RADICULOPATHY: ICD-10-CM

## 2024-05-24 DIAGNOSIS — M51.36 DEGENERATION OF LUMBAR INTERVERTEBRAL DISC: ICD-10-CM

## 2024-05-24 PROCEDURE — 1036F TOBACCO NON-USER: CPT | Performed by: PHYSICIAN ASSISTANT

## 2024-05-24 PROCEDURE — 99214 OFFICE O/P EST MOD 30 MIN: CPT | Performed by: PHYSICIAN ASSISTANT

## 2024-05-24 PROCEDURE — 1159F MED LIST DOCD IN RCRD: CPT | Performed by: PHYSICIAN ASSISTANT

## 2024-05-24 PROCEDURE — 1160F RVW MEDS BY RX/DR IN RCRD: CPT | Performed by: PHYSICIAN ASSISTANT

## 2024-05-24 PROCEDURE — 1125F AMNT PAIN NOTED PAIN PRSNT: CPT | Performed by: PHYSICIAN ASSISTANT

## 2024-05-24 RX ORDER — HYDROCODONE BITARTRATE AND ACETAMINOPHEN 5; 325 MG/1; MG/1
1 TABLET ORAL EVERY 8 HOURS PRN
Qty: 90 TABLET | Refills: 0 | Status: SHIPPED | OUTPATIENT
Start: 2024-05-24 | End: 2024-06-23

## 2024-05-24 ASSESSMENT — LIFESTYLE VARIABLES
HOW MANY STANDARD DRINKS CONTAINING ALCOHOL DO YOU HAVE ON A TYPICAL DAY: PATIENT DOES NOT DRINK
HOW OFTEN DO YOU HAVE SIX OR MORE DRINKS ON ONE OCCASION: NEVER
SKIP TO QUESTIONS 9-10: 1
AUDIT-C TOTAL SCORE: 0
HOW OFTEN DO YOU HAVE A DRINK CONTAINING ALCOHOL: NEVER

## 2024-05-24 ASSESSMENT — ENCOUNTER SYMPTOMS
CHEST TIGHTNESS: 0
PALPITATIONS: 0
VOMITING: 0
NAUSEA: 0
FATIGUE: 0
ACTIVITY CHANGE: 0
DIARRHEA: 0
SLEEP DISTURBANCE: 0
BACK PAIN: 1
FEVER: 0
SHORTNESS OF BREATH: 0
COUGH: 0
VOICE CHANGE: 0
UNEXPECTED WEIGHT CHANGE: 0
SORE THROAT: 0
JOINT SWELLING: 1
CHILLS: 0
ARTHRALGIAS: 1

## 2024-05-24 ASSESSMENT — PAIN SCALES - GENERAL
PAINLEVEL_OUTOF10: 9
PAINLEVEL: 9

## 2024-05-24 ASSESSMENT — PATIENT HEALTH QUESTIONNAIRE - PHQ9
2. FEELING DOWN, DEPRESSED OR HOPELESS: NOT AT ALL
SUM OF ALL RESPONSES TO PHQ9 QUESTIONS 1 & 2: 0
1. LITTLE INTEREST OR PLEASURE IN DOING THINGS: NOT AT ALL

## 2024-05-24 ASSESSMENT — PAIN - FUNCTIONAL ASSESSMENT: PAIN_FUNCTIONAL_ASSESSMENT: 0-10

## 2024-05-24 NOTE — PROGRESS NOTES
Subjective   Patient ID: Kika Amor is a 89 y.o. female who presents for Back Pain (Back and knees).  Is an 89-year-old female with bilateral knee pain degenerative lumbar disc disease primary osteoarthritis of the knees with history of fractures in the lower extremity the presents today for follow-up.  Patient denotes that she is nervous about her renal mass.  She had an ultrasound and it is showing that it grow 0.2 cm.  Patient has a follow-up with her urologist.  Patient denotes that this urologist has changed the device that she inserts due to the prolapse of her vagina.  She states that this new device no longer causes bleeding to her vaginal region.  Patient is happy with this.  Patient denotes that she uses assistive devices to help with ambulation.  Patient does note that prolonged walking tend to aggravate her knee and back symptoms.  Patient states that her son does help her with some of her ADL care and difficult moving of large heavy objects.  Patient denies any injuries or trauma.  Patient states that she still feels safe at home patient denies any sedative effects with the current medication.  Patient regularly follows up with her physicians    Back Pain  Pertinent negatives include no chest pain or fever.       Review of Systems   Constitutional:  Negative for activity change, chills, fatigue, fever and unexpected weight change.   HENT:  Negative for ear pain, sore throat and voice change.    Eyes:  Negative for visual disturbance.   Respiratory:  Negative for cough, chest tightness and shortness of breath.    Cardiovascular:  Negative for chest pain and palpitations.   Gastrointestinal:  Negative for diarrhea, nausea and vomiting.   Musculoskeletal:  Positive for arthralgias, back pain, gait problem and joint swelling.   Psychiatric/Behavioral:  Negative for behavioral problems, self-injury, sleep disturbance and suicidal ideas.        Objective   Physical Exam  Vitals reviewed.    Constitutional:       Appearance: Normal appearance.   HENT:      Head: Normocephalic and atraumatic.      Mouth/Throat:      Mouth: Mucous membranes are moist.   Neck:      Vascular: No JVD.   Pulmonary:      Effort: Pulmonary effort is normal. No tachypnea or bradypnea.   Abdominal:      Palpations: Abdomen is soft.   Musculoskeletal:      Thoracic back: Tenderness present.      Lumbar back: Tenderness present. Decreased range of motion. Positive left straight leg raise test. Negative right straight leg raise test.        Back:       Right knee: Swelling and crepitus present. Decreased range of motion. Tenderness present over the medial joint line and lateral joint line.      Left knee: Crepitus present. Decreased range of motion. Tenderness present over the medial joint line and lateral joint line.   Skin:     General: Skin is warm and dry.   Neurological:      Mental Status: She is alert and oriented to person, place, and time.   Psychiatric:         Mood and Affect: Mood normal.         Behavior: Behavior normal. Behavior is cooperative.       Assessment/Plan   Problem List Items Addressed This Visit             ICD-10-CM    Degeneration of lumbar intervertebral disc M51.36    Relevant Medications    HYDROcodone-acetaminophen (Norco) 5-325 mg tablet    HYDROcodone-acetaminophen (Norco) 5-325 mg tablet    Lumbar radiculopathy M54.16    Relevant Medications    HYDROcodone-acetaminophen (Norco) 5-325 mg tablet    HYDROcodone-acetaminophen (Norco) 5-325 mg tablet    Primary osteoarthritis of knees, bilateral M17.0    Relevant Medications    HYDROcodone-acetaminophen (Norco) 5-325 mg tablet    HYDROcodone-acetaminophen (Norco) 5-325 mg tablet   I had nice discussion with the patient today our plan will be as follows.      Radiology: [ none at this time ]      Physically:  [ continue modification of activities, healthy lifestyle choice, consider mobility scoter, grocery order and .  ]      Psychologically:   [ No acute psychological concerns ]      Medication: [I will refill the medications at the same dose and frequency. We will continue to monitor the patient bimonthly for compliance, adverse reaction or interations The patient continues to see benefit and improvement in their quality of life and ability to maintain ADLs. Patient educated about the risks of taking opioids and operating a motor vehicle. Patient reports no adverse side effects to current medication regimen. Current regimen does allow patient to maintain ADLs. Oarrs has been reviewed. No suspicion of diversion or abuse. Compliance with medication regime, no use of illicit drugs, no sharing of narcotic medications with others, do not use others narcotic medication, and to avoid alcohol use. Patient has been educated on the risks, benefits, and alternatives of controlled substances as well as the proper way to store these medications.   The patient and I discussed the nature of this medication and its side effects. We discussed tolerance, physical dependence, psychological dependence, addiction and opioid-induced hyperalgesia ]      Duration:  [ 2 months ]      Intervention:  [Sinew to follow with medical management of the renal cyst.  I did talk to patient and may want to consider potentially the risks benefits for potential surgical intervention.  I am not a urologist I am not a renal specialist but I think she should take time to consider especially at her advanced age and the risks benefits associated with any surgical procedures]           Steve Huffman PA-C 05/24/24 2:53 PM

## 2024-05-24 NOTE — PROGRESS NOTES
MEDICATION NAME: Hydrocodone   STRENGTH: 5-325  LAST FILL DATE: 24  DATE LAST TAKEN: 24  QUANTITY FILLED: 90  QUANTITY REMAININ  COUNT COMPLETED BY: ARLIN ACUÑA and SUSAN Laird      UDS LAST COMPLETED:   CONTROLLED SUBSTANCES AGREEMENT LAST SIGNED:   ORT LAST COMPLETED:  Modified Osw[employer] disability form filled out annually.

## 2024-06-20 ENCOUNTER — OFFICE VISIT (OUTPATIENT)
Dept: UROLOGY | Facility: CLINIC | Age: 89
End: 2024-06-20
Payer: MEDICARE

## 2024-06-20 VITALS — TEMPERATURE: 98.2 F | WEIGHT: 111 LBS | HEIGHT: 62 IN | BODY MASS INDEX: 20.43 KG/M2

## 2024-06-20 DIAGNOSIS — S91.002A WOUND OF LEFT ANKLE, INITIAL ENCOUNTER: Primary | ICD-10-CM

## 2024-06-20 DIAGNOSIS — N28.89 RENAL MASS, RIGHT: ICD-10-CM

## 2024-06-20 PROCEDURE — 1159F MED LIST DOCD IN RCRD: CPT | Performed by: STUDENT IN AN ORGANIZED HEALTH CARE EDUCATION/TRAINING PROGRAM

## 2024-06-20 PROCEDURE — 99214 OFFICE O/P EST MOD 30 MIN: CPT | Performed by: STUDENT IN AN ORGANIZED HEALTH CARE EDUCATION/TRAINING PROGRAM

## 2024-06-20 PROCEDURE — G2211 COMPLEX E/M VISIT ADD ON: HCPCS | Performed by: STUDENT IN AN ORGANIZED HEALTH CARE EDUCATION/TRAINING PROGRAM

## 2024-06-20 NOTE — PROGRESS NOTES
HPI:    Kika Amor is a 89 y.o. female previously seen by Dr. Boyce for KATINA renal cysts and gross hematuria. CT AP w/ contrast (7/22/22) showed stable 2.6 cm right renal neoplasm since 01/30/2017. US Kidney KATINA (5/26/23) showed unchanged size of a 2.4 cm solid lesion within the right interpolar kidney which demonstrates internal flow on Doppler interrogation, findings are concerning for neoplastic involvement, bilateral simple renal cysts, no hydronephrosis bilaterally. US renal (4/5/24) showed slight interval increase in size of a mass in the mid right kidney measuring up to 2.6 cm, previously up to 2.4 cm, highly concerning for renal cell carcinoma, multiple additional bilateral simple renal cysts, no hydronephrosis or nephrolithiasis in either kidney. Good urinary function.     Cre: 0.83 (4/26/24)     CT AP w/ contrast (7/22/22): stable 2.6 cm right renal neoplasm since 01/30/2017.     US Kidney katina (5/26/23): unchanged size of a 2.4 cm solid lesion within the right interpolar kidney which demonstrates internal flow on Doppler interrogation, findings are concerning for neoplastic involvement, bilateral simple renal cysts, no hydronephrosis bilaterally.     US renal (4/5/24): slight interval increase in size of a mass in the mid right kidney measuring up to 2.6 cm, previously up to 2.4 cm, highly concerning for renal cell carcinoma, multiple additional bilateral simple renal cysts, no hydronephrosis or nephrolithiasis in either kidney.     Review of Systems:  All systems reviewed. Anything negative noted in the HPI.    Physical Exam:  Vitals signs reviewed.  Constitutional:      Appearance: Well-developed.  HENT:     Head: Normocephalic and atraumatic.  Neck:     Musculoskeletal: Normal range of motion.  Pulmonary:     Effort: Pulmonary effort is normal.  Musculoskeletal: Normal range of motion.  Skin:     General: Skin is warm and dry.  Neurological:     Mental Status: Alert and oriented to person, place,  and time.  Psychiatric:        Behavior: Behavior normal.        Thought Content: Thought content normal.        Judgment: Judgment normal.    Procedures:    Assessment/Plan   Kika Amor is a 89 y.o. female previously seen by Dr. Boyce for KATINA renal cysts and gross hematuria. CT AP w/ contrast (7/22/22) showed stable 2.6 cm right renal neoplasm since 01/30/2017. US Kidney KATINA (5/26/23) showed unchanged size of a 2.4 cm solid lesion within the right interpolar kidney which demonstrates internal flow on Doppler interrogation, findings are concerning for neoplastic involvement, bilateral simple renal cysts, no hydronephrosis bilaterally. US renal (4/5/24) showed slight interval increase in size of a mass in the mid right kidney measuring up to 2.6 cm, previously up to 2.4 cm, highly concerning for renal cell carcinoma, multiple additional bilateral simple renal cysts, no hydronephrosis or nephrolithiasis in either kidney. Good urinary function. Management options including risks, benefits and alternatives discussed at length and all questions answered. Patient prefers to proceed with follow-up in 1 year with renal US.     Will refer to wound care clinic for ankle ulcer per patient request.         Scribe Attestation:  By signing my name below, ISofiya Scribe   attest that this documentation has been prepared under the direction and in the presence of Js Sheppard MD.

## 2024-06-24 ENCOUNTER — APPOINTMENT (OUTPATIENT)
Dept: WOUND CARE | Facility: CLINIC | Age: 89
End: 2024-06-24
Payer: MEDICARE

## 2024-06-25 ENCOUNTER — APPOINTMENT (OUTPATIENT)
Dept: UROLOGY | Facility: CLINIC | Age: 89
End: 2024-06-25
Payer: MEDICARE

## 2024-06-25 ENCOUNTER — OFFICE VISIT (OUTPATIENT)
Dept: WOUND CARE | Facility: CLINIC | Age: 89
End: 2024-06-25
Payer: MEDICARE

## 2024-06-25 VITALS
HEART RATE: 73 BPM | BODY MASS INDEX: 20.27 KG/M2 | DIASTOLIC BLOOD PRESSURE: 65 MMHG | WEIGHT: 110.8 LBS | SYSTOLIC BLOOD PRESSURE: 142 MMHG

## 2024-06-25 DIAGNOSIS — L97.322 NON-PRESSURE CHRONIC ULCER OF LEFT ANKLE WITH FAT LAYER EXPOSED (MULTI): ICD-10-CM

## 2024-06-25 DIAGNOSIS — N28.89 RENAL MASS, RIGHT: ICD-10-CM

## 2024-06-25 DIAGNOSIS — Z46.89 ENCOUNTER FOR FITTING AND ADJUSTMENT OF PESSARY: ICD-10-CM

## 2024-06-25 DIAGNOSIS — Z96.0 PRESENCE OF PESSARY: Primary | ICD-10-CM

## 2024-06-25 DIAGNOSIS — N95.2 ATROPHIC VAGINITIS: ICD-10-CM

## 2024-06-25 DIAGNOSIS — N81.10 VAGINAL PROLAPSE: ICD-10-CM

## 2024-06-25 DIAGNOSIS — Z46.89 PESSARY MAINTENANCE: ICD-10-CM

## 2024-06-25 DIAGNOSIS — N36.2 URETHRAL CARUNCLE: ICD-10-CM

## 2024-06-25 PROCEDURE — 99213 OFFICE O/P EST LOW 20 MIN: CPT | Mod: 25

## 2024-06-25 PROCEDURE — 99213 OFFICE O/P EST LOW 20 MIN: CPT | Performed by: OBSTETRICS & GYNECOLOGY

## 2024-06-25 PROCEDURE — 1036F TOBACCO NON-USER: CPT | Performed by: OBSTETRICS & GYNECOLOGY

## 2024-06-25 PROCEDURE — 1159F MED LIST DOCD IN RCRD: CPT | Performed by: OBSTETRICS & GYNECOLOGY

## 2024-06-25 PROCEDURE — 11042 DBRDMT SUBQ TIS 1ST 20SQCM/<: CPT

## 2024-06-25 PROCEDURE — 1160F RVW MEDS BY RX/DR IN RCRD: CPT | Performed by: OBSTETRICS & GYNECOLOGY

## 2024-06-25 NOTE — PROGRESS NOTES
Subjective   Patient ID: Kika Amor is a 89 y.o. female who presents for pessary maintenance.    HPI  88-year-old with history of #4 cube pessary, stable 3 cm simple adnexal cyst, history of bilateral kidney cysts, significant vaginal atrophy and weakness with prolapsing hemorrhoids, lower extremity hardware preventing external rotation of the bilateral hip joints, presenting for pessary refitting from a 2.75 inch Gellhorn short stem placed 5/16/2023 to a 2.5 inch Gellhorn short stem 2/27/2024.     The patient appears to be satisfied with the pessary, she denies any bugle complaints. She denies any vaginal complaints, no discharge.  She is utilizing the vaginal estrogen cream .    She denies any lower urinary tract complaints. She denies any UTI like symptoms.     She does note bleeding hemorrhoids, has sporadic difficulty having a bowel movements. She is a known case of diverticulitis. She denies any fecal or flatal incontinence.    She has  wound on her heel which is not healing appropriately.     She has no other complaints    From Previous note  88-year-old with history of #4 cube pessary, stable 3 cm simple adnexal cyst, history of bilateral kidney cysts, significant vaginal atrophy and weakness with prolapsing hemorrhoids, lower extremity hardware preventing external rotation of the bilateral hip joints, presenting with 2.75 inch Gellhorn short stem originally placed 5/16/2023      The patient appears to be satisfied with the pessary, she denies any bugle complaints. She denies any vaginal complaints, no discharge.  She is utilizing the vaginal estrogen cream .    She  did note leaking when she bend over but this has improved since, she denies any lower urinary tract complaints. She denies any UTI like symptoms.     She does note bleeding hemorrhoids, no fecal or flatal incontinence.    She has no other complaints.    From Previous note  88-year-old with history of #4 cube pessary, stable 3 cm simple  adnexal cyst, history of bilateral kidney cysts, significant vaginal atrophy and weakness with prolapsing hemorrhoids, lower extremity hardware preventing external rotation of the bilateral hip joints, presenting with a 2.75 inch Gellhorn short stem placed 5/16/2023    The patient appears to be extremely satisfied with the pessary, she denies any bugle complaints. She denies any vaginal complaints, no discharge.  She is utilizing the vaginal estrogen cream .    Ultrasound pelvis redemonstrated  previously-seen right ovarian cyst, now  measuring 3.3 x 2.9 x 2.7 cm, previously measuring 3.4 x 3.2 x 2.2 cm. She is noting resolution of her pain complaints.    She denies any lower urinary tract complaints. She denies any UTI like symptoms.     She denies any bowel related complaints, no fecal or flatal incontinence.    She has no other complaints.    From Previous note  88-year-old with history of #4 cube pessary, stable 3 cm simple adnexal cyst, history of bilateral kidney cysts, significant vaginal atrophy and weakness with prolapsing hemorrhoids, lower extremity hardware preventing external rotation of the bilateral hip joints, presenting for a 2.75 inch Gellhorn short stem pessary refitting.     The patient appears to be extremely satisfied with the pessary, she denies any bugle complaints. She reports pain associated with her right ovarian cysts, pelvic ultrasound form 1/27/2023 demonstrates 3.4 cm left ovarian cyst. According to her report while undergoing a kidney ultrasound the radiologist evaluated her pelvis and it was extremely painful, there is no official report of this.    She denies any lower urinary tract complaints. She denies any UTI like symptoms.     She denies any bowel related complaints, no fecal or flatal incontinence.    She has no other complaints.    From Previous note  88-year-old with history of #4 cube pessary, stable 3 cm simple adnexal cyst, history of bilateral kidney cysts, significant  vaginal atrophy and weakness with prolapsing hemorrhoids, lower extremity hardware preventing external rotation of the bilateral hip joints, presenting for a 2.75 inch Gellhorn short stem pessary refitting.      The patient s continues to note and is bothered by the bulge with her present 2.5 inch Gellhorn short stem in place.She states she is able to void in the morning but notes that its bothersome when she moves around. Denies any bowel changes with the pessary in place     She has no other complaints.     From previous note  88-year-old with history of #4 cube pessary, stable 3 cm simple adnexal cyst, history of bilateral kidney cysts, significant vaginal atrophy and weakness with prolapsing hemorrhoids presenting for pessary follow-up.     The patient s continues to note and is bothered by the bulge. She believes that the pessary was exposed but on exam it is in place. She also complaints of sensation of incomplete bladder emptying. She states she is able to void in the morning but notes that its bothersome when she moves around. Surgical correction was discussed at length. She is not utilizing the vaginal estrogen cream. however she notes bleeding.     She denies any bowel related complaints, no fecal or flatal incontinence.     She has no other complaints.     From previous note  88-year-old with history of #4 cube pessary, stable 3 cm simple adnexal cyst, history of bilateral kidney cysts, significant vaginal atrophy and weakness with prolapsing hemorrhoids having undergone acute pessary removal and pessary refitting on 2/9/2023.     The patient presents today with discomfort associated with this pessary, she states it is coming down and she is feeling the bulge despite the pessary being in place.      She denies any bowel related complaints, no fecal or flatal incontinence.     She has no other complaints.     From previous note  88-year-old with cube pessary in place, history of adnexal cyst on July 2022  "[facility] ultrasound, history of bilateral kidney cysts, significant vaginal atrophy and weakness with prolapsing hemorrhoids presenting today for pessary refitting.     She has no other complaints.         From previous note  88-year-old presenting as a referral from Dr. Sophia Boyce with complaints of urinary urgency, kidney mass and history of gross hematuria with pessary in place.     The patient was previously seen by Dr. Sophia Boyce for kidney mass and gross hematuria with a benign cystoscopy performed 12/5/2022. She also has a pessary in place which Dr. Miles replaced with a smaller size and is working better. Most recent pessary maintenance was performed earlier in January. She states that this is a cube. She is having this removed and replaced every 2 months. She denies and bleeding except for the when the pessary is being manipulated. She also complaints of urinary urgency, she notes 1-2 episodes of nocturia but otherwise denies any significant daytime frequency or urgency. She wears a pad to avoid accidents but she does not leak unless she is \"bending over\". She denies any history of stress urinary incontinence.      She is not sexually active and was noted to have a 3 cm ovarian cyst in July 2022 on [facility] imaging. She denies any vaginal complaints, no abnormal vaginal bleeding or discharge.     She has a regular soft bowel movement, She denies any bowel related complaints, no fecal or flatal incontinence.     She has no other complaints.      Review of Systems  Constitutional: No fever, No chills and No fatigue.   Eyes: No vision problems and No dryness of the eyes.   ENT: No dry mouth, No hearing loss and No nosebleeds.   Cardiovascular: No chest pain, No palpitations and No orthopnea.   Respiratory: No shortness of breath, No cough and No wheezing.   Gastrointestinal: No abdominal pain, No constipation, No nausea, No diarrhea, No vomiting and No melena.   Genitourinary: As noted in HPI.   Musculoskeletal: No " back pain, No myalgias, No muscle weakness, No joint swelling and No leg edema.   Integumentary: No rashes, No skin lesion and No itching.   Neurological: No headache, No numbness and No dizziness.   Psychiatric: No sleep disturbances, No anxiety and No depression.   Endocrine: No hot flashes, No loss of hair and No hirsutism.   Hematologic/Lymphatic: No swollen glands, No tendency for easy bleeding and No tendency for easy bruising.   All other systems have been reviewed and are negative for complaint.        Objective   Physical Exam  Patient ID: Kika Amor is a 89 y.o. female.    Procedures  FPM[facility] Procedure:  New placement of pessary and Pessary removal.   Indication (s): pelvic organ prolapse.   Findings include: cystocele, atrophy and A 2.5 inch Gellhorn short stem was removed without difficulty..  Lidocaine gel was used.  This was cleaned and replaced       Assessment/Plan      88-year-old with history of #4 cube pessary, stable 3 cm simple adnexal cyst, history of bilateral kidney cysts, significant vaginal atrophy and weakness with prolapsing hemorrhoids, lower extremity hardware preventing external rotation of the bilateral hip joints, presenting for pessary refitting from a 2.75 inch Gellhorn short stem placed 5/16/2023 to a 2.5 inch Gellhorn short stem 2/27/2024.     #1 the patient is to continue to follow-up with Dr. Sheppard.  She is known to have a solid 2.4 cm mass of the right pole and several cysts of the bilateral kidneys measuring up to 3 to 4 cm. She has had multiple imaging performed through [facility]. Patient will have yearly follow up with ultrasound per his recommendations.     #2 we discussed her previous imaging performed in July 2022 demonstrating a 3 cm ovarian cyst. Repeat pelvic ultrasound 1/27/2023 demonstrates stable simple 3 cm ovarian cyst. She states that during her recent Kidney ultrasound her ultrasonographer imaged her right lower quadrant which caused significant  discomfort  following evaluation.  This pain is significantly improved.  No further imaging is indicated at this time.     3. Patient's cube pessary was removed 2/9/2023 with excoriations and discharge noted and the patient was refitted with a 2.25 inch Gellhorn short stem. The patient was dissatisfied with this and it was refitted 3/7/2023 with a 2.5 inch Gellhorn short stem.  She noted vaginal bulging around this pessary when on her feet. She denies any pain noted some difficulty with urination. She was refitted with a 2.75 inch Gellhorn short stem 5/16/2023. We did discuss at length potential for surgical options. She is a candidate for a sacrospinous ligament suspension but she has significantly reduced external rotation of her bilateral hip joints. However there is access vaginally. We did briefly discussed the risks of bleeding, infection, damage surrounding tissues, postoperative restrictions, and success. Continue her Gellhorn pessary moving forward.  There was some difficulty in removing and replacing the pessary 10/17/2023 and 2/27/2024.  She was therefore refitted with a 2.5 inch Gellhorn short stem 2/27/2024.  We discussed the importance of restarting her vaginal estrogen therapy now.  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  We discussed the safety of utilizing this cream in light of her renal concerns.     4. The patient is unbothered by her hemorrhoids. We discussed the importance of fiber therapy daily to improve her bowel movement constipation complaints.     5. She will continue her vaginal estrogen therapy 3 times a week moving forward.     #6 We will follow-up in November 2024 for pessary maintenance.       YADIRA Youssef MD     Scribe Attestation  By signing my name below, IDanae Scribe attest that this documentation has been prepared under the direction and in the presence of Sang Youssef MD. All medical record entries made by the Scribe were at my direction or personally  dictated by me. I have reviewed the chart and agree that the record accurately reflects my personal performance of the history, physical exam, discussion and plan.

## 2024-06-25 NOTE — PATIENT INSTRUCTIONS
Please restart your 3 times a week.    Please follow-up in November 2024 for pessary maintenance.    Please follow-up with Dr. Sheppard per his recommendations.    Please contact the clinic with any questions or concerns.    [phone]

## 2024-07-02 ENCOUNTER — APPOINTMENT (OUTPATIENT)
Dept: WOUND CARE | Facility: CLINIC | Age: 89
End: 2024-07-02
Payer: MEDICARE

## 2024-07-09 ENCOUNTER — OFFICE VISIT (OUTPATIENT)
Dept: WOUND CARE | Facility: CLINIC | Age: 89
End: 2024-07-09
Payer: MEDICARE

## 2024-07-09 PROCEDURE — 11042 DBRDMT SUBQ TIS 1ST 20SQCM/<: CPT

## 2024-07-15 ENCOUNTER — APPOINTMENT (OUTPATIENT)
Dept: VASCULAR MEDICINE | Facility: CLINIC | Age: 89
End: 2024-07-15
Payer: MEDICARE

## 2024-07-16 ENCOUNTER — OFFICE VISIT (OUTPATIENT)
Dept: WOUND CARE | Facility: CLINIC | Age: 89
End: 2024-07-16
Payer: MEDICARE

## 2024-07-16 PROCEDURE — 11042 DBRDMT SUBQ TIS 1ST 20SQCM/<: CPT

## 2024-07-23 ENCOUNTER — OFFICE VISIT (OUTPATIENT)
Dept: WOUND CARE | Facility: CLINIC | Age: 89
End: 2024-07-23
Payer: MEDICARE

## 2024-07-23 PROCEDURE — 99213 OFFICE O/P EST LOW 20 MIN: CPT

## 2024-07-24 ENCOUNTER — OFFICE VISIT (OUTPATIENT)
Dept: PAIN MEDICINE | Facility: CLINIC | Age: 89
End: 2024-07-24
Payer: MEDICARE

## 2024-07-24 VITALS
HEART RATE: 62 BPM | DIASTOLIC BLOOD PRESSURE: 70 MMHG | RESPIRATION RATE: 18 BRPM | OXYGEN SATURATION: 97 % | BODY MASS INDEX: 20.24 KG/M2 | HEIGHT: 62 IN | SYSTOLIC BLOOD PRESSURE: 157 MMHG | WEIGHT: 110 LBS

## 2024-07-24 DIAGNOSIS — Z79.899 HISTORY OF ONGOING TREATMENT WITH HIGH-RISK MEDICATION: Primary | ICD-10-CM

## 2024-07-24 DIAGNOSIS — M17.0 PRIMARY OSTEOARTHRITIS OF KNEES, BILATERAL: ICD-10-CM

## 2024-07-24 DIAGNOSIS — M54.16 LUMBAR RADICULOPATHY: ICD-10-CM

## 2024-07-24 DIAGNOSIS — M51.36 DEGENERATION OF LUMBAR INTERVERTEBRAL DISC: ICD-10-CM

## 2024-07-24 DIAGNOSIS — N28.89 RENAL MASS, RIGHT: ICD-10-CM

## 2024-07-24 PROCEDURE — 80365 DRUG SCREENING OXYCODONE: CPT | Mod: WESLAB | Performed by: PHYSICIAN ASSISTANT

## 2024-07-24 PROCEDURE — 99214 OFFICE O/P EST MOD 30 MIN: CPT | Performed by: PHYSICIAN ASSISTANT

## 2024-07-24 PROCEDURE — 1125F AMNT PAIN NOTED PAIN PRSNT: CPT | Performed by: PHYSICIAN ASSISTANT

## 2024-07-24 PROCEDURE — 1159F MED LIST DOCD IN RCRD: CPT | Performed by: PHYSICIAN ASSISTANT

## 2024-07-24 PROCEDURE — 1036F TOBACCO NON-USER: CPT | Performed by: PHYSICIAN ASSISTANT

## 2024-07-24 PROCEDURE — 1160F RVW MEDS BY RX/DR IN RCRD: CPT | Performed by: PHYSICIAN ASSISTANT

## 2024-07-24 PROCEDURE — 80307 DRUG TEST PRSMV CHEM ANLYZR: CPT | Mod: WESLAB | Performed by: PHYSICIAN ASSISTANT

## 2024-07-24 RX ORDER — HYDROCODONE BITARTRATE AND ACETAMINOPHEN 5; 325 MG/1; MG/1
1 TABLET ORAL EVERY 8 HOURS PRN
Qty: 90 TABLET | Refills: 0 | Status: SHIPPED | OUTPATIENT
Start: 2024-07-24 | End: 2024-08-23

## 2024-07-24 ASSESSMENT — ENCOUNTER SYMPTOMS
FATIGUE: 0
SHORTNESS OF BREATH: 0
FEVER: 0
COUGH: 0
PALPITATIONS: 0
CHEST TIGHTNESS: 0
JOINT SWELLING: 1
NAUSEA: 0
SLEEP DISTURBANCE: 0
VOMITING: 0
CHILLS: 0
UNEXPECTED WEIGHT CHANGE: 0
SORE THROAT: 0
BACK PAIN: 1
PAIN: 1
DIARRHEA: 0
VOICE CHANGE: 0
ACTIVITY CHANGE: 0
ARTHRALGIAS: 1

## 2024-07-24 ASSESSMENT — PATIENT HEALTH QUESTIONNAIRE - PHQ9
1. LITTLE INTEREST OR PLEASURE IN DOING THINGS: NOT AT ALL
2. FEELING DOWN, DEPRESSED OR HOPELESS: NOT AT ALL
SUM OF ALL RESPONSES TO PHQ9 QUESTIONS 1 & 2: 0

## 2024-07-24 ASSESSMENT — PAIN - FUNCTIONAL ASSESSMENT: PAIN_FUNCTIONAL_ASSESSMENT: 0-10

## 2024-07-24 ASSESSMENT — PAIN SCALES - GENERAL
PAINLEVEL_OUTOF10: 8
PAINLEVEL: 8

## 2024-07-24 ASSESSMENT — LIFESTYLE VARIABLES
AUDIT-C TOTAL SCORE: 0
HOW MANY STANDARD DRINKS CONTAINING ALCOHOL DO YOU HAVE ON A TYPICAL DAY: PATIENT DOES NOT DRINK
HOW OFTEN DO YOU HAVE SIX OR MORE DRINKS ON ONE OCCASION: NEVER
HOW OFTEN DO YOU HAVE A DRINK CONTAINING ALCOHOL: NEVER
SKIP TO QUESTIONS 9-10: 1

## 2024-07-24 NOTE — PROGRESS NOTES
Subjective   Patient ID: Kika Amor is a 89 y.o. female who presents for Pain.  Patient is an 89-year-old female with DDD of the lumbar with radiculopathy bilateral knee osteoarthritis history of hip arthroplasties right renal mass long-term use of opiate analgesic the presents today for follow-up.  Patient has been under the wound care due to a nonhealing wound on her leg with left lower extremity.  They are having her follow-up with cardiology and vascular due to potentially poor wound healing.  Patient denotes that her urologist is debating moving forward with the renal mass surgery as that is slow-growing.  Patient denotes that she has been having variable knee pain hip and low back pain.  Modification of activities do provide some reduction of symptoms.  Patient continues to see her psychological provider and provided Xanax that does help with her sleep issues    Pain  Associated symptoms include joint swelling. Pertinent negatives include no chest pain, diarrhea, fatigue, fever, nausea, shortness of breath or vomiting.       Review of Systems   Constitutional:  Negative for activity change, chills, fatigue, fever and unexpected weight change.   HENT:  Negative for ear pain, sore throat and voice change.    Eyes:  Negative for visual disturbance.   Respiratory:  Negative for cough, chest tightness and shortness of breath.    Cardiovascular:  Negative for chest pain and palpitations.   Gastrointestinal:  Negative for diarrhea, nausea and vomiting.   Musculoskeletal:  Positive for arthralgias, back pain, gait problem and joint swelling.   Psychiatric/Behavioral:  Negative for behavioral problems, self-injury, sleep disturbance and suicidal ideas.        Objective   Physical Exam  Vitals reviewed.   Constitutional:       Appearance: Normal appearance.   HENT:      Head: Normocephalic and atraumatic.      Mouth/Throat:      Mouth: Mucous membranes are moist.   Neck:      Vascular: No JVD.   Pulmonary:       Effort: Pulmonary effort is normal. No tachypnea or bradypnea.   Abdominal:      Palpations: Abdomen is soft.   Musculoskeletal:      Thoracic back: Tenderness present.      Lumbar back: Tenderness present. Decreased range of motion. Positive left straight leg raise test. Negative right straight leg raise test.        Back:       Right knee: Swelling and crepitus present. Decreased range of motion. Tenderness present over the medial joint line and lateral joint line.      Left knee: Crepitus present. Decreased range of motion. Tenderness present over the medial joint line and lateral joint line.   Skin:     General: Skin is warm and dry.   Neurological:      Mental Status: She is alert and oriented to person, place, and time.   Psychiatric:         Mood and Affect: Mood normal.         Behavior: Behavior normal. Behavior is cooperative.       Assessment/Plan   Problem List Items Addressed This Visit             ICD-10-CM    Degeneration of lumbar intervertebral disc M51.36    Lumbar radiculopathy M54.16    Primary osteoarthritis of knees, bilateral M17.0    Renal mass, right N28.89     Other Visit Diagnoses         Codes    History of ongoing treatment with high-risk medication    -  Primary Z79.899        I had nice discussion with the patient today our plan will be as follows.      Radiology: [ none at this time ]      Physically:  [ continue modification of activities, healthy lifestyle choice ]      Psychologically:  [ No acute psychological concerns ]      Medication: [I will refill the medications at the same dose and frequency. We will continue to monitor the patient bimonthly for compliance, adverse reaction or interactions The patient continues to see benefit and improvement in their quality of life and ability to maintain ADLs. Patient educated about the risks of taking opioids and operating a motor vehicle. Patient reports no adverse side effects to current medication regimen. Current regimen does allow  patient to maintain ADLs. Oarrs has been reviewed. No suspicion of diversion or abuse. Compliance with medication regime, no use of illicit drugs, no sharing of narcotic medications with others, do not use others narcotic medication, and to avoid alcohol use. Patient has been educated on the risks, benefits, and alternatives of controlled substances as well as the proper way to store these medications.   The patient and I discussed the nature of this medication and its side effects. We discussed tolerance, physical dependence, psychological dependence, addiction and opioid-induced hyperalgesia   Pt to submit sample for tox screen. ]    Duration:  [ 2 months ]      Intervention:  [ continue with cardiology follow up, wound care, and uroloy/renal mass.  ]           Steve Huffman PA-C 07/24/24 1:49 PM

## 2024-07-25 LAB
AMPHETAMINES UR QL SCN: NORMAL
BARBITURATES UR QL SCN: NORMAL
BZE UR QL SCN: NORMAL
CANNABINOIDS UR QL SCN: NORMAL
CREAT UR-MCNC: 170.8 MG/DL (ref 20–320)
PCP UR QL SCN: NORMAL

## 2024-07-27 LAB
1OH-MIDAZOLAM UR CFM-MCNC: <25 NG/ML
6MAM UR CFM-MCNC: <25 NG/ML
7AMINOCLONAZEPAM UR CFM-MCNC: <25 NG/ML
A-OH ALPRAZ UR CFM-MCNC: >1000 NG/ML
ALPRAZ UR CFM-MCNC: 560 NG/ML
CHLORDIAZEP UR CFM-MCNC: <25 NG/ML
CLONAZEPAM UR CFM-MCNC: <25 NG/ML
CODEINE UR CFM-MCNC: <50 NG/ML
DIAZEPAM UR CFM-MCNC: <25 NG/ML
EDDP UR CFM-MCNC: <25 NG/ML
FENTANYL UR CFM-MCNC: <2.5 NG/ML
HYDROCODONE CTO UR CFM-MCNC: >2500 NG/ML
HYDROMORPHONE UR CFM-MCNC: 1036 NG/ML
LORAZEPAM UR CFM-MCNC: <25 NG/ML
METHADONE UR CFM-MCNC: <25 NG/ML
MIDAZOLAM UR CFM-MCNC: <25 NG/ML
MORPHINE UR CFM-MCNC: <50 NG/ML
NORDIAZEPAM UR CFM-MCNC: <25 NG/ML
NORFENTANYL UR CFM-MCNC: <2.5 NG/ML
NORHYDROCODONE UR CFM-MCNC: >1000 NG/ML
NOROXYCODONE UR CFM-MCNC: <25 NG/ML
NORTRAMADOL UR-MCNC: <50 NG/ML
OXAZEPAM UR CFM-MCNC: <25 NG/ML
OXYCODONE UR CFM-MCNC: <25 NG/ML
OXYMORPHONE UR CFM-MCNC: <25 NG/ML
TEMAZEPAM UR CFM-MCNC: <25 NG/ML
TRAMADOL UR CFM-MCNC: <50 NG/ML
ZOLPIDEM UR CFM-MCNC: <25 NG/ML
ZOLPIDEM UR-MCNC: <25 NG/ML

## 2024-08-01 ENCOUNTER — APPOINTMENT (OUTPATIENT)
Dept: VASCULAR MEDICINE | Facility: CLINIC | Age: 89
End: 2024-08-01
Payer: MEDICARE

## 2024-08-05 ENCOUNTER — HOSPITAL ENCOUNTER (OUTPATIENT)
Dept: VASCULAR MEDICINE | Facility: CLINIC | Age: 89
Discharge: HOME | End: 2024-08-05
Payer: MEDICARE

## 2024-08-05 DIAGNOSIS — L97.322 NON-PRESSURE CHRONIC ULCER OF LEFT ANKLE WITH FAT LAYER EXPOSED (MULTI): ICD-10-CM

## 2024-08-05 DIAGNOSIS — I73.9 PERIPHERAL VASCULAR DISEASE, UNSPECIFIED (CMS-HCC): ICD-10-CM

## 2024-08-05 PROCEDURE — 93923 UPR/LXTR ART STDY 3+ LVLS: CPT | Performed by: STUDENT IN AN ORGANIZED HEALTH CARE EDUCATION/TRAINING PROGRAM

## 2024-08-05 PROCEDURE — 93923 UPR/LXTR ART STDY 3+ LVLS: CPT

## 2024-08-06 ENCOUNTER — OFFICE VISIT (OUTPATIENT)
Dept: WOUND CARE | Facility: CLINIC | Age: 89
End: 2024-08-06
Payer: MEDICARE

## 2024-08-06 DIAGNOSIS — L97.322 NON-PRESSURE CHRONIC ULCER OF LEFT ANKLE WITH FAT LAYER EXPOSED (MULTI): ICD-10-CM

## 2024-08-06 PROCEDURE — 11042 DBRDMT SUBQ TIS 1ST 20SQCM/<: CPT

## 2024-08-06 PROCEDURE — 87077 CULTURE AEROBIC IDENTIFY: CPT | Performed by: STUDENT IN AN ORGANIZED HEALTH CARE EDUCATION/TRAINING PROGRAM

## 2024-08-08 ENCOUNTER — OFFICE VISIT (OUTPATIENT)
Dept: CARDIOLOGY | Facility: HOSPITAL | Age: 89
End: 2024-08-08
Payer: MEDICARE

## 2024-08-08 VITALS
HEART RATE: 65 BPM | DIASTOLIC BLOOD PRESSURE: 73 MMHG | SYSTOLIC BLOOD PRESSURE: 145 MMHG | OXYGEN SATURATION: 92 % | BODY MASS INDEX: 19.66 KG/M2 | WEIGHT: 107.5 LBS

## 2024-08-08 DIAGNOSIS — S91.009A ANKLE WOUND: Primary | ICD-10-CM

## 2024-08-08 PROCEDURE — 1159F MED LIST DOCD IN RCRD: CPT | Performed by: HOSPITALIST

## 2024-08-08 PROCEDURE — 1036F TOBACCO NON-USER: CPT | Performed by: HOSPITALIST

## 2024-08-08 PROCEDURE — 99213 OFFICE O/P EST LOW 20 MIN: CPT | Performed by: HOSPITALIST

## 2024-08-08 PROCEDURE — 99203 OFFICE O/P NEW LOW 30 MIN: CPT | Performed by: HOSPITALIST

## 2024-08-08 RX ORDER — MUPIROCIN 20 MG/G
OINTMENT TOPICAL
COMMUNITY
Start: 2024-06-12

## 2024-08-08 RX ORDER — ALPRAZOLAM 1 MG/1
1 TABLET ORAL
COMMUNITY
Start: 2024-07-27

## 2024-08-08 ASSESSMENT — COLUMBIA-SUICIDE SEVERITY RATING SCALE - C-SSRS
1. IN THE PAST MONTH, HAVE YOU WISHED YOU WERE DEAD OR WISHED YOU COULD GO TO SLEEP AND NOT WAKE UP?: NO
6. HAVE YOU EVER DONE ANYTHING, STARTED TO DO ANYTHING, OR PREPARED TO DO ANYTHING TO END YOUR LIFE?: NO
2. HAVE YOU ACTUALLY HAD ANY THOUGHTS OF KILLING YOURSELF?: NO

## 2024-08-08 NOTE — PROGRESS NOTES
Subjective   Kika Amor is a 89 y.o. female with PMH of renal mass, diverticulosis, gastritis, anemia, arthritis, hearing difficulty, and other comorbidities, who was referred by her podiatrist Dr. Pinzon for evaluation of PAD.  Patient has a tiny wound on the lateral malleolus of her left ankle, has been present for years, has been seen by dermatology in the past.  The wound has been stable and it had a scab at one time, she was just diagnosed with infection in that area and she is getting antibiotics.  I reviewed her PVR testing from 8/5/2024, incompressible ABIs but multiphasic waveforms throughout with normal normal digits perfusion.    Patient is on aspirin 81 mg once daily and Norvasc 2.5 mg once daily.    PVR testing on 8/5/2024 with right JAYDEN of 2.0/2.04 on the right TBI 0.98, left JAYDEN of 2.0/1.57 left TBI of 0.97.  Multiphasic waveforms throughout.    Review of Systems  ROS is negative other than in HPI.      Objective   Physical Exam  General: NAD  HEENT: IEOM, PERRL   Neck: No JVD or carotid bruit  Lungs: CTAB  Heart: RRR, normal S1 and S2, no loud murmurs  Abdomen: Soft, nontender, positive bowel sounds  Extremities: No edema.  Tiny wound on the lateral malleolus of the left ankle.  Neurologic: No FND  Psychiatric: Normal mood and affect    Assessment/Plan   1-nonhealing wound:  -See HPI for details.  -I reviewed her PVR testing from 8/5/2024, incompressible ABIs but multiphasic waveforms throughout with normal normal digits perfusion.  -No indication for peripheral angiogram.  -Wound care and infection treatment as per Dr. Pinzon.    Return to clinic as needed.        Eleazar Owens MD

## 2024-08-08 NOTE — PATIENT INSTRUCTIONS
Thank you so much for visiting us today.    Your blood flow testing looks great.    Please continue to follow-up with Dr. James Pinzon for wound care.    Feel free to call us at [phone] if you have any question or you need help.

## 2024-08-09 LAB
BACTERIA SPEC CULT: ABNORMAL
BACTERIA SPEC CULT: ABNORMAL
GRAM STN SPEC: ABNORMAL
GRAM STN SPEC: ABNORMAL

## 2024-08-20 ENCOUNTER — OFFICE VISIT (OUTPATIENT)
Dept: WOUND CARE | Facility: CLINIC | Age: 89
End: 2024-08-20
Payer: MEDICARE

## 2024-08-20 PROCEDURE — 99213 OFFICE O/P EST LOW 20 MIN: CPT

## 2024-08-27 ENCOUNTER — OFFICE VISIT (OUTPATIENT)
Dept: WOUND CARE | Facility: CLINIC | Age: 89
End: 2024-08-27
Payer: MEDICARE

## 2024-08-27 PROCEDURE — 11042 DBRDMT SUBQ TIS 1ST 20SQCM/<: CPT

## 2024-09-03 ENCOUNTER — APPOINTMENT (OUTPATIENT)
Dept: WOUND CARE | Facility: CLINIC | Age: 89
End: 2024-09-03
Payer: MEDICARE

## 2024-09-10 ENCOUNTER — OFFICE VISIT (OUTPATIENT)
Dept: WOUND CARE | Facility: CLINIC | Age: 89
End: 2024-09-10
Payer: MEDICARE

## 2024-09-10 PROCEDURE — 11042 DBRDMT SUBQ TIS 1ST 20SQCM/<: CPT

## 2024-09-18 ENCOUNTER — OFFICE VISIT (OUTPATIENT)
Dept: PAIN MEDICINE | Facility: CLINIC | Age: 89
End: 2024-09-18
Payer: MEDICARE

## 2024-09-18 VITALS
BODY MASS INDEX: 19.69 KG/M2 | DIASTOLIC BLOOD PRESSURE: 70 MMHG | SYSTOLIC BLOOD PRESSURE: 142 MMHG | RESPIRATION RATE: 18 BRPM | OXYGEN SATURATION: 96 % | HEART RATE: 75 BPM | HEIGHT: 62 IN | WEIGHT: 107 LBS

## 2024-09-18 DIAGNOSIS — M17.0 PRIMARY OSTEOARTHRITIS OF KNEES, BILATERAL: ICD-10-CM

## 2024-09-18 DIAGNOSIS — M54.16 LUMBAR RADICULOPATHY: ICD-10-CM

## 2024-09-18 DIAGNOSIS — M51.36 DEGENERATION OF LUMBAR INTERVERTEBRAL DISC: ICD-10-CM

## 2024-09-18 PROCEDURE — 1125F AMNT PAIN NOTED PAIN PRSNT: CPT | Performed by: PHYSICIAN ASSISTANT

## 2024-09-18 PROCEDURE — 99214 OFFICE O/P EST MOD 30 MIN: CPT | Performed by: PHYSICIAN ASSISTANT

## 2024-09-18 PROCEDURE — 1160F RVW MEDS BY RX/DR IN RCRD: CPT | Performed by: PHYSICIAN ASSISTANT

## 2024-09-18 PROCEDURE — 1159F MED LIST DOCD IN RCRD: CPT | Performed by: PHYSICIAN ASSISTANT

## 2024-09-18 PROCEDURE — 1036F TOBACCO NON-USER: CPT | Performed by: PHYSICIAN ASSISTANT

## 2024-09-18 RX ORDER — HYDROCODONE BITARTRATE AND ACETAMINOPHEN 5; 325 MG/1; MG/1
1 TABLET ORAL EVERY 8 HOURS PRN
Qty: 90 TABLET | Refills: 0 | Status: SHIPPED | OUTPATIENT
Start: 2024-09-18 | End: 2024-10-18

## 2024-09-18 ASSESSMENT — ENCOUNTER SYMPTOMS
PAIN: 1
DIARRHEA: 0
VOICE CHANGE: 0
ARTHRALGIAS: 1
CHILLS: 0
NAUSEA: 0
PALPITATIONS: 0
FEVER: 0
UNEXPECTED WEIGHT CHANGE: 0
JOINT SWELLING: 1
SLEEP DISTURBANCE: 0
FATIGUE: 0
ACTIVITY CHANGE: 0
VOMITING: 0
CHEST TIGHTNESS: 0
COUGH: 0
BACK PAIN: 1
SORE THROAT: 0
SHORTNESS OF BREATH: 0

## 2024-09-18 ASSESSMENT — PAIN SCALES - GENERAL
PAINLEVEL_OUTOF10: 7
PAINLEVEL: 7

## 2024-09-18 ASSESSMENT — LIFESTYLE VARIABLES
SKIP TO QUESTIONS 9-10: 1
AUDIT-C TOTAL SCORE: 0
HOW OFTEN DO YOU HAVE SIX OR MORE DRINKS ON ONE OCCASION: NEVER
HOW MANY STANDARD DRINKS CONTAINING ALCOHOL DO YOU HAVE ON A TYPICAL DAY: PATIENT DOES NOT DRINK
HOW OFTEN DO YOU HAVE A DRINK CONTAINING ALCOHOL: NEVER

## 2024-09-18 ASSESSMENT — PAIN - FUNCTIONAL ASSESSMENT: PAIN_FUNCTIONAL_ASSESSMENT: 0-10

## 2024-09-18 NOTE — PROGRESS NOTES
Subjective   Patient ID: Kika Amor is a 89 y.o. female who presents for Pain.  Is an 89-year-old female with DJD of the bilateral knees with surgical repair lumbar radiculopathy intervertebral disc displacement (a follow-up.  Patient has been following with her eye doctors and improvement in her vision renal cell tumor has been slow-growing there is no surgery indicated at this time continued monitoring.  Patient has been going to wound care she had been placed on antibiotics and she notes that there has been more filling in the cavity of the wound.  She continues to note that her left knee has been increasing in pain she is concerned about infection she brought laboratory values from [facility] for review today    Pain  Associated symptoms include joint swelling. Pertinent negatives include no chest pain, diarrhea, fatigue, fever, nausea, shortness of breath or vomiting.       Review of Systems   Constitutional:  Negative for activity change, chills, fatigue, fever and unexpected weight change.   HENT:  Negative for ear pain, sore throat and voice change.    Eyes:  Negative for visual disturbance.   Respiratory:  Negative for cough, chest tightness and shortness of breath.    Cardiovascular:  Negative for chest pain and palpitations.   Gastrointestinal:  Negative for diarrhea, nausea and vomiting.   Musculoskeletal:  Positive for arthralgias, back pain, gait problem and joint swelling.   Psychiatric/Behavioral:  Negative for behavioral problems, self-injury, sleep disturbance and suicidal ideas.        Objective   Physical Exam  Vitals reviewed.   Constitutional:       Appearance: Normal appearance.   HENT:      Head: Normocephalic and atraumatic.      Mouth/Throat:      Mouth: Mucous membranes are moist.   Neck:      Vascular: No JVD.   Pulmonary:      Effort: Pulmonary effort is normal. No tachypnea or bradypnea.   Abdominal:      Palpations: Abdomen is soft.   Musculoskeletal:      Thoracic back:  Tenderness present.      Lumbar back: Tenderness present. Decreased range of motion. Positive left straight leg raise test. Negative right straight leg raise test.        Back:       Right knee: Swelling and crepitus present. Decreased range of motion. Tenderness present over the medial joint line and lateral joint line.      Left knee: Crepitus present. Decreased range of motion. Tenderness present over the medial joint line and lateral joint line.   Skin:     General: Skin is warm and dry.   Neurological:      Mental Status: She is alert and oriented to person, place, and time.   Psychiatric:         Mood and Affect: Mood normal.         Behavior: Behavior normal. Behavior is cooperative.       Assessment/Plan   Problem List Items Addressed This Visit             ICD-10-CM    Degeneration of lumbar intervertebral disc M51.36    Lumbar radiculopathy M54.16    Primary osteoarthritis of knees, bilateral M17.0   I had nice discussion with the patient today our plan will be as follows.      Radiology: [ none at this time ]      Physically:  [ continue modification of activities, healthy lifestyle choice ]      Psychologically:  [ No acute psychological concerns. There are no mental health issues of which I am aware that are contributing to the patient's pain. There are no substance abuse or alcohol abuse issues of which I am aware that are contributing to the patient's pain. ]      Medication: [I will refill the medications at the same dose and frequency. We will continue to monitor the patient bimonthly for compliance, adverse reaction or interactions The patient continues to see benefit and improvement in their quality of life and ability to maintain ADLs. Patient educated about the risks of taking opioids and operating a motor vehicle. Patient reports no adverse side effects to current medication regimen. Current regimen does allow patient to maintain ADLs. Oarrs has been reviewed. No suspicion of diversion or  abuse. Compliance with medication regime, no use of illicit drugs, no sharing of narcotic medications with others, do not use others narcotic medication, and to avoid alcohol use. Patient has been educated on the risks, benefits, and alternatives of controlled substances as well as the proper way to store these medications.   The patient and I discussed the nature of this medication and its side effects. We discussed tolerance, physical dependence, psychological dependence, addiction and opioid-induced hyperalgesia ]      Duration:  [ 2 month ]      Intervention:  [ none at this time. Patient is stable. Patient sed rate was 5 WBCs within normal limits no signs of shift subtle left or immature neutrophils on the laboratory values.  I do not feel that patient is currently active infected and I do not believe that her increased knee pain is related to inflammatory or infectious processes. ]           Steve Huffman PA-C 09/18/24 2:05 PM

## 2024-09-18 NOTE — PROGRESS NOTES
MEDICATION NAME: Norco  STRENGTH: 5-325  LAST FILL DATE: 24  DATE LAST TAKEN: 24  QUANTITY FILLED: 90  QUANTITY REMAININ  COUNT COMPLETED BY: ARLIN ACUÑA and SUSAN IRWIN      UDS LAST COMPLETED:   CONTROLLED SUBSTANCES AGREEMENT LAST SIGNED:   ORT LAST COMPLETED:  Modified [employer] disability form filled out annually.

## 2024-09-24 ENCOUNTER — OFFICE VISIT (OUTPATIENT)
Dept: WOUND CARE | Facility: CLINIC | Age: 89
End: 2024-09-24
Payer: MEDICARE

## 2024-09-24 PROCEDURE — 99212 OFFICE O/P EST SF 10 MIN: CPT

## 2024-10-01 ENCOUNTER — APPOINTMENT (OUTPATIENT)
Dept: WOUND CARE | Facility: CLINIC | Age: 89
End: 2024-10-01
Payer: MEDICARE

## 2024-11-05 ENCOUNTER — APPOINTMENT (OUTPATIENT)
Dept: UROLOGY | Facility: CLINIC | Age: 89
End: 2024-11-05
Payer: MEDICARE

## 2024-11-05 DIAGNOSIS — N81.10 VAGINAL PROLAPSE: ICD-10-CM

## 2024-11-05 DIAGNOSIS — N28.89 RENAL MASS, RIGHT: ICD-10-CM

## 2024-11-05 DIAGNOSIS — N36.2 URETHRAL CARUNCLE: ICD-10-CM

## 2024-11-05 DIAGNOSIS — Z46.89 PESSARY MAINTENANCE: ICD-10-CM

## 2024-11-05 DIAGNOSIS — N95.2 ATROPHIC VAGINITIS: ICD-10-CM

## 2024-11-05 DIAGNOSIS — Z96.0 PRESENCE OF PESSARY: Primary | ICD-10-CM

## 2024-11-05 PROCEDURE — 1160F RVW MEDS BY RX/DR IN RCRD: CPT | Performed by: OBSTETRICS & GYNECOLOGY

## 2024-11-05 PROCEDURE — 99213 OFFICE O/P EST LOW 20 MIN: CPT | Performed by: OBSTETRICS & GYNECOLOGY

## 2024-11-05 PROCEDURE — 1159F MED LIST DOCD IN RCRD: CPT | Performed by: OBSTETRICS & GYNECOLOGY

## 2024-11-05 PROCEDURE — G2211 COMPLEX E/M VISIT ADD ON: HCPCS | Performed by: OBSTETRICS & GYNECOLOGY

## 2024-11-05 PROCEDURE — 1036F TOBACCO NON-USER: CPT | Performed by: OBSTETRICS & GYNECOLOGY

## 2024-11-05 RX ORDER — ESTRADIOL 0.1 MG/G
CREAM VAGINAL
Qty: 42.5 G | Refills: 2 | Status: SHIPPED | OUTPATIENT
Start: 2024-11-05

## 2024-11-05 NOTE — PROGRESS NOTES
Subjective   Patient ID: Kika Amor is a 89 y.o. female who presents for pessary maintenance.    HPI  88-year-old with history of #4 cube pessary, stable 3 cm simple adnexal cyst, history of bilateral kidney cysts, significant vaginal atrophy and weakness with prolapsing hemorrhoids, lower extremity hardware preventing external rotation of the bilateral hip joints, presenting for pessary refitting from a 2.75 inch Gellhorn short stem placed 5/16/2023 to a 2.5 inch Gellhorn short stem 2/27/2024.    The patient appears to be satisfied with the pessary, she denies any bugle complaints. She denies any vaginal complaints, no discharge.  She is utilizing the vaginal estrogen cream .    She denies any lower urinary tract complaints. She denies any UTI like symptoms.     She has constipation complaints. She denies any fecal or flatal incontinence.    She has no other complaints    From Previous note  88-year-old with history of #4 cube pessary, stable 3 cm simple adnexal cyst, history of bilateral kidney cysts, significant vaginal atrophy and weakness with prolapsing hemorrhoids, lower extremity hardware preventing external rotation of the bilateral hip joints, presenting for pessary refitting from a 2.75 inch Gellhorn short stem placed 5/16/2023 to a 2.5 inch Gellhorn short stem 2/27/2024.     The patient appears to be satisfied with the pessary, she denies any bugle complaints. She denies any vaginal complaints, no discharge.  She is utilizing the vaginal estrogen cream .    She denies any lower urinary tract complaints. She denies any UTI like symptoms.     She does note bleeding hemorrhoids, has sporadic difficulty having a bowel movements. She is a known case of diverticulitis. She denies any fecal or flatal incontinence.    She has  wound on her heel which is not healing appropriately.     She has no other complaints    From Previous note  88-year-old with history of #4 cube pessary, stable 3 cm simple adnexal  cyst, history of bilateral kidney cysts, significant vaginal atrophy and weakness with prolapsing hemorrhoids, lower extremity hardware preventing external rotation of the bilateral hip joints, presenting with 2.75 inch Gellhorn short stem originally placed 5/16/2023      The patient appears to be satisfied with the pessary, she denies any bugle complaints. She denies any vaginal complaints, no discharge.  She is utilizing the vaginal estrogen cream .    She  did note leaking when she bend over but this has improved since, she denies any lower urinary tract complaints. She denies any UTI like symptoms.     She does note bleeding hemorrhoids, no fecal or flatal incontinence.    She has no other complaints.    From Previous note  88-year-old with history of #4 cube pessary, stable 3 cm simple adnexal cyst, history of bilateral kidney cysts, significant vaginal atrophy and weakness with prolapsing hemorrhoids, lower extremity hardware preventing external rotation of the bilateral hip joints, presenting with a 2.75 inch Gellhorn short stem placed 5/16/2023    The patient appears to be extremely satisfied with the pessary, she denies any bugle complaints. She denies any vaginal complaints, no discharge.  She is utilizing the vaginal estrogen cream .    Ultrasound pelvis redemonstrated  previously-seen right ovarian cyst, now  measuring 3.3 x 2.9 x 2.7 cm, previously measuring 3.4 x 3.2 x 2.2 cm. She is noting resolution of her pain complaints.    She denies any lower urinary tract complaints. She denies any UTI like symptoms.     She denies any bowel related complaints, no fecal or flatal incontinence.    She has no other complaints.    From Previous note  88-year-old with history of #4 cube pessary, stable 3 cm simple adnexal cyst, history of bilateral kidney cysts, significant vaginal atrophy and weakness with prolapsing hemorrhoids, lower extremity hardware preventing external rotation of the bilateral hip joints,  presenting for a 2.75 inch Gellhorn short stem pessary refitting.     The patient appears to be extremely satisfied with the pessary, she denies any bugle complaints. She reports pain associated with her right ovarian cysts, pelvic ultrasound form 1/27/2023 demonstrates 3.4 cm left ovarian cyst. According to her report while undergoing a kidney ultrasound the radiologist evaluated her pelvis and it was extremely painful, there is no official report of this.    She denies any lower urinary tract complaints. She denies any UTI like symptoms.     She denies any bowel related complaints, no fecal or flatal incontinence.    She has no other complaints.    From Previous note  88-year-old with history of #4 cube pessary, stable 3 cm simple adnexal cyst, history of bilateral kidney cysts, significant vaginal atrophy and weakness with prolapsing hemorrhoids, lower extremity hardware preventing external rotation of the bilateral hip joints, presenting for a 2.75 inch Gellhorn short stem pessary refitting.      The patient s continues to note and is bothered by the bulge with her present 2.5 inch Gellhorn short stem in place.She states she is able to void in the morning but notes that its bothersome when she moves around. Denies any bowel changes with the pessary in place     She has no other complaints.     From previous note  88-year-old with history of #4 cube pessary, stable 3 cm simple adnexal cyst, history of bilateral kidney cysts, significant vaginal atrophy and weakness with prolapsing hemorrhoids presenting for pessary follow-up.     The patient s continues to note and is bothered by the bulge. She believes that the pessary was exposed but on exam it is in place. She also complaints of sensation of incomplete bladder emptying. She states she is able to void in the morning but notes that its bothersome when she moves around. Surgical correction was discussed at length. She is not utilizing the vaginal estrogen cream.  "however she notes bleeding.     She denies any bowel related complaints, no fecal or flatal incontinence.     She has no other complaints.     From previous note  88-year-old with history of #4 cube pessary, stable 3 cm simple adnexal cyst, history of bilateral kidney cysts, significant vaginal atrophy and weakness with prolapsing hemorrhoids having undergone acute pessary removal and pessary refitting on 2/9/2023.     The patient presents today with discomfort associated with this pessary, she states it is coming down and she is feeling the bulge despite the pessary being in place.      She denies any bowel related complaints, no fecal or flatal incontinence.     She has no other complaints.     From previous note  88-year-old with cube pessary in place, history of adnexal cyst on July 2022 [facility] ultrasound, history of bilateral kidney cysts, significant vaginal atrophy and weakness with prolapsing hemorrhoids presenting today for pessary refitting.     She has no other complaints.         From previous note  88-year-old presenting as a referral from Dr. Sophia Boyce with complaints of urinary urgency, kidney mass and history of gross hematuria with pessary in place.     The patient was previously seen by Dr. Sophia Boyce for kidney mass and gross hematuria with a benign cystoscopy performed 12/5/2022. She also has a pessary in place which Dr. Miles replaced with a smaller size and is working better. Most recent pessary maintenance was performed earlier in January. She states that this is a cube. She is having this removed and replaced every 2 months. She denies and bleeding except for the when the pessary is being manipulated. She also complaints of urinary urgency, she notes 1-2 episodes of nocturia but otherwise denies any significant daytime frequency or urgency. She wears a pad to avoid accidents but she does not leak unless she is \"bending over\". She denies any history of stress urinary incontinence.      She " is not sexually active and was noted to have a 3 cm ovarian cyst in July 2022 on [facility] imaging. She denies any vaginal complaints, no abnormal vaginal bleeding or discharge.     She has a regular soft bowel movement, She denies any bowel related complaints, no fecal or flatal incontinence.     She has no other complaints.      Review of Systems  Constitutional: No fever, No chills and No fatigue.   Eyes: No vision problems and No dryness of the eyes.   ENT: No dry mouth, No hearing loss and No nosebleeds.   Cardiovascular: No chest pain, No palpitations and No orthopnea.   Respiratory: No shortness of breath, No cough and No wheezing.   Gastrointestinal: No abdominal pain, No constipation, No nausea, No diarrhea, No vomiting and No melena.   Genitourinary: As noted in HPI.   Musculoskeletal: No back pain, No myalgias, No muscle weakness, No joint swelling and No leg edema.   Integumentary: No rashes, No skin lesion and No itching.   Neurological: No headache, No numbness and No dizziness.   Psychiatric: No sleep disturbances, No anxiety and No depression.   Endocrine: No hot flashes, No loss of hair and No hirsutism.   Hematologic/Lymphatic: No swollen glands, No tendency for easy bleeding and No tendency for easy bruising.   All other systems have been reviewed and are negative for complaint.        Objective   Physical Exam  Patient ID: Kika Amor is a 89 y.o. female.    Procedures  FPMRS Procedure:  New placement of pessary and Pessary removal.   Indication (s): pelvic organ prolapse.   Findings include: cystocele, atrophy and A 2.5 inch Gellhorn short stem was removed without difficulty..  Lidocaine gel was used.  This was cleaned and replaced       Assessment/Plan      88-year-old with history of #4 cube pessary, stable 3 cm simple adnexal cyst, history of bilateral kidney cysts, significant vaginal atrophy and weakness with prolapsing hemorrhoids, lower extremity hardware preventing external rotation of  the bilateral hip joints, presenting for pessary refitting from a 2.75 inch Gellhorn short stem placed 5/16/2023 to a 2.5 inch Gellhorn short stem 2/27/2024.     #1 the patient is to continue to follow-up with Dr. Sheppard.  She is known to have a solid 2.4 cm mass of the right pole and several cysts of the bilateral kidneys measuring up to 3 to 4 cm. She has had multiple imaging performed through [facility]. Patient will have yearly follow up with ultrasound per his recommendations.     #2 we discussed her previous imaging performed in July 2022 demonstrating a 3 cm ovarian cyst. Repeat pelvic ultrasound 1/27/2023 demonstrates stable simple 3 cm ovarian cyst. She states that during her recent Kidney ultrasound her ultrasonographer imaged her right lower quadrant which caused significant  discomfort following evaluation.  This pain is significantly improved.  No further imaging is indicated at this time.     3. Patient's cube pessary was removed 2/9/2023 with excoriations and discharge noted and the patient was refitted with a 2.25 inch Gellhorn short stem. The patient was dissatisfied with this and it was refitted 3/7/2023 with a 2.5 inch Gellhorn short stem.  She noted vaginal bulging around this pessary when on her feet. She denies any pain noted some difficulty with urination. She was refitted with a 2.75 inch Gellhorn short stem 5/16/2023. We did discuss at length potential for surgical options. She is a candidate for a sacrospinous ligament suspension but she has significantly reduced external rotation of her bilateral hip joints. However there is access vaginally. We did briefly discussed the risks of bleeding, infection, damage surrounding tissues, postoperative restrictions, and success. Continue her Gellhorn pessary moving forward.  There was some difficulty in removing and replacing the pessary 10/17/2023 and 2/27/2024.  She was therefore refitted with a 2.5 inch Gellhorn short stem 2/27/2024.  We discussed the  importance of restarting her vaginal estrogen therapy now.  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  We discussed the safety of utilizing this cream in light of her renal concerns.     4. The patient is unbothered by her hemorrhoids. We discussed the importance of fiber therapy daily to improve her bowel movement constipation complaints.     5. She will continue her vaginal estrogen therapy 3 times a week moving forward.  She was provided a written prescription for this today.     #6 We will follow-up in March 2025 for pessary maintenance     YADIRA Youssef MD     Scribe Attestation  By signing my name below, I, Alicia Willson attest that this documentation has been prepared under the direction and in the presence of Sang Youssef MD. All medical record entries made by the Scribe were at my direction or personally dictated by me. I have reviewed the chart and agree that the record accurately reflects my personal performance of the history, physical exam, discussion and plan.

## 2024-11-05 NOTE — PATIENT INSTRUCTIONS
Please continue your vaginal estrogen cream 3 times a week.    Please follow-up in February/March 2025 for pessary maintenance.    Please use MiraLAX daily to help with your constipation. Please titrate this to a soft bowel movement every day. Please use a fiber supplement that can include Metamucil, Citrucel, FiberCon, fiber biscuits, or fiber Gummies as well.     Please contact the clinic with any questions or concerns.    [phone]

## 2024-11-15 ENCOUNTER — ANCILLARY PROCEDURE (OUTPATIENT)
Dept: URGENT CARE | Age: 89
End: 2024-11-15
Payer: MEDICARE

## 2024-11-15 ENCOUNTER — OFFICE VISIT (OUTPATIENT)
Dept: URGENT CARE | Age: 89
End: 2024-11-15
Payer: MEDICARE

## 2024-11-15 VITALS
TEMPERATURE: 98 F | SYSTOLIC BLOOD PRESSURE: 153 MMHG | OXYGEN SATURATION: 97 % | DIASTOLIC BLOOD PRESSURE: 83 MMHG | RESPIRATION RATE: 16 BRPM | HEART RATE: 86 BPM

## 2024-11-15 DIAGNOSIS — R82.998 LEUKOCYTES IN URINE: Primary | ICD-10-CM
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
## 2024-11-15 DIAGNOSIS — N30.00 ACUTE CYSTITIS WITHOUT HEMATURIA: ICD-10-CM
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
## 2024-11-15 DIAGNOSIS — S30.1XXA CONTUSION OF FLANK AND BACK: ICD-10-CM
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
## 2024-11-15 DIAGNOSIS — S20.229A CONTUSION OF FLANK AND BACK: ICD-10-CM

## 2024-11-15 LAB
POC APPEARANCE, URINE: CLEAR
POC BILIRUBIN, URINE: ABNORMAL
POC BLOOD, URINE: NEGATIVE
POC COLOR, URINE: ABNORMAL
POC GLUCOSE, URINE: NEGATIVE MG/DL
POC KETONES, URINE: ABNORMAL MG/DL
POC LEUKOCYTES, URINE: ABNORMAL
POC NITRITE,URINE: NEGATIVE
POC PH, URINE: 5.5 PH
POC PROTEIN, URINE: ABNORMAL MG/DL
POC SPECIFIC GRAVITY, URINE: >=1.03
POC UROBILINOGEN, URINE: 4 EU/DL

## 2024-11-15 PROCEDURE — 87086 URINE CULTURE/COLONY COUNT: CPT

## 2024-11-15 PROCEDURE — 72100 X-RAY EXAM L-S SPINE 2/3 VWS: CPT | Performed by: PHYSICIAN ASSISTANT

## 2024-11-15 PROCEDURE — 71111 X-RAY EXAM RIBS/CHEST4/> VWS: CPT | Performed by: PHYSICIAN ASSISTANT

## 2024-11-15 RX ORDER — NITROFURANTOIN 25; 75 MG/1; MG/1
100 CAPSULE ORAL 2 TIMES DAILY
Qty: 14 CAPSULE | Refills: 0 | Status: SHIPPED | OUTPATIENT
Start: 2024-11-15 | End: 2024-11-22

## 2024-11-15 ASSESSMENT — ENCOUNTER SYMPTOMS
FLANK PAIN: 1
WOUND: 0
RESPIRATORY NEGATIVE: 1
CONSTITUTIONAL NEGATIVE: 1
HEMATURIA: 0
BACK PAIN: 1

## 2024-11-15 ASSESSMENT — PAIN SCALES - GENERAL: PAINLEVEL_OUTOF10: 7

## 2024-11-15 NOTE — PROGRESS NOTES
Subjective   Patient ID: Kika Amor is a 89 y.o. female. They present today with a chief complaint of Flank Pain (C/O L side flank pain post a fall from yesterday. ).    History of Present Illness  Patient describes tripping on an electrical cord and falling at home yesterday.  When she fell she struck her left lower back against a hard back or arm or side of a chair.  She thinks she may have fallen onto her knees after that.  Denies hitting her head.  She is here today because she has a lump on her back.  Denies difficulty breathing.  The pain was worse yesterday than it is today.  Denies hematuria or any other symptoms.  She tells me she has a mass on her kidney but is unable to tell me what type of mass.  Patient complains of moderate pain to her flank and back today.  She tells me the pain was worse yesterday.      History provided by:  Patient and relative  History limited by:  Age and dementia   used: No    Flank Pain  Associated symptoms: chest pain        Past Medical History  Allergies as of 11/15/2024 - Reviewed 11/15/2024   Allergen Reaction Noted    Cephalexin Swelling 09/14/2023    Levofloxacin Dizziness and GI Upset 09/14/2023       (Not in a hospital admission)       Past Medical History:   Diagnosis Date    Thoracic spine pain        Past Surgical History:   Procedure Laterality Date    OTHER SURGICAL HISTORY  08/10/2022    Uterine surgery    OTHER SURGICAL HISTORY  08/10/2022    Rib fracture repair    OTHER SURGICAL HISTORY  08/10/2022    Hip surgery    OTHER SURGICAL HISTORY  12/05/2022    Eye surgery        reports that she has never smoked. She has never used smokeless tobacco. She reports that she does not drink alcohol and does not use drugs.    Review of Systems  Review of Systems   Constitutional: Negative.    Respiratory: Negative.     Cardiovascular:  Positive for chest pain.        Complains of pain to the left posterior chest wall   Genitourinary:  Positive for  flank pain. Negative for hematuria and pelvic pain.   Musculoskeletal:  Positive for back pain.        Tells me she is using a cane to walk since she fell yesterday.  But she also tells me that she uses the cane to walk and that is what trips her up on the electrical cord   Skin:  Negative for wound.                                  Objective    Vitals:    11/15/24 1454   BP: 153/83   BP Location: Left arm   Patient Position: Sitting   BP Cuff Size: Child   Pulse: 86   Resp: 16   Temp: 36.7 °C (98 °F)   TempSrc: Oral   SpO2: 97%     No LMP recorded.    Physical Exam  Vitals and nursing note reviewed.   Constitutional:       Appearance: Normal appearance.      Comments: Thin but not cachectic elderly female here with a family member.  She is ambulatory with a cane.  She appears to be a little confused.  She answers questions in a very roundabout fashion and loses track of what she is talking about.   HENT:      Head: Normocephalic and atraumatic.   Cardiovascular:      Rate and Rhythm: Normal rate and regular rhythm.   Pulmonary:      Effort: Pulmonary effort is normal. No respiratory distress.      Breath sounds: Normal breath sounds. No stridor. No wheezing, rhonchi or rales.      Comments: Tenderness to the left posterior chest wall inferiorly there is an approximately 2 x 2 cm round hard tender but not bruised area to the left lower flank/chest wall.  The hardened area feels as though it is superficial to a rib.  Chest:      Chest wall: Tenderness present.   Skin:     General: Skin is warm and dry.      Findings: No bruising.   Neurological:      Mental Status: She is alert.         Procedures    Point of Care Test & Imaging Results from this visit  No results found for this visit on 11/15/24.   No results found.    Diagnostic study results (if any) were reviewed by Anne Quesada PA-C.    Assessment/Plan   Allergies, medications, history, and pertinent labs/EKGs/Imaging reviewed by Anne Quesada PA-C.      Medical Decision Making  Sustained a fall at home tripping over an electrical cord.  She struck her back on a chair she is unsure if it was the hard smooth back of the chair or the hard more rounded part of the arm of the chair.  She tells me she did not fall all the way to the ground.  She later tells me she may have fallen under her knees.  History is a little unreliable.  She is here today complaining of pain to the left flank and tells me she can feel a lump that she is afraid could be her kidney.  She tells me she has a mass on her kidney but does not know what kind.  She denies any head injury.  Denies any other injury complaints or worries at this time.  Examination does show some tenderness to the posterior left flank but no bony abnormalities.  She is not having any difficulty breathing.  She is ambulating normally with her cane.  X-rays are negative for fracture.  Urinalysis is negative for blood but there is evidence of urinary tract infection with 500 leukocytes.  Will treat her urinary tract infection with Keflex and send a urine culture.  She is asked to follow-up with her PCP in a day or 2.  She is here with her son and it was discussed that if she feels worse develops trouble breathing blood in her urine or any other concerning symptoms she should go to the emergency department    Orders and Diagnoses  Diagnoses and all orders for this visit:  Fall against object  -     XR ribs 3 views bilateral w chest pa or ap; Future  -     XR lumbar spine 2-3 views; Future  -     POCT UA Automated manually resulted  Fall as cause of accidental injury in home as place of occurrence, initial encounter  -     XR ribs 3 views bilateral w chest pa or ap; Future  -     XR lumbar spine 2-3 views; Future  -     POCT UA Automated manually resulted      Medical Admin Record      Patient disposition: Home    Electronically signed by Anne Quesada PA-C  3:35 PM

## 2024-11-17 LAB — BACTERIA UR CULT: NORMAL

## 2024-11-19 ENCOUNTER — TELEPHONE (OUTPATIENT)
Dept: UROLOGY | Facility: CLINIC | Age: 89
End: 2024-11-19

## 2024-11-19 NOTE — TELEPHONE ENCOUNTER
Went to the ER was prescribed nitrofurantoin, macrocrystal-monohydrate, (Macrobid) 100 mg capsule   Causing upset stomach & diarrhea   Asking for another medication to be sent to [facility] [place]  Also stated she never received written script for Estradiol cream  Asking for that to be sent as well

## 2024-11-20 ENCOUNTER — OFFICE VISIT (OUTPATIENT)
Dept: PAIN MEDICINE | Facility: CLINIC | Age: 89
End: 2024-11-20
Payer: MEDICARE

## 2024-11-20 VITALS
OXYGEN SATURATION: 96 % | HEIGHT: 62 IN | SYSTOLIC BLOOD PRESSURE: 125 MMHG | DIASTOLIC BLOOD PRESSURE: 66 MMHG | BODY MASS INDEX: 19.69 KG/M2 | WEIGHT: 107 LBS | HEART RATE: 73 BPM | RESPIRATION RATE: 18 BRPM

## 2024-11-20 DIAGNOSIS — M17.0 PRIMARY OSTEOARTHRITIS OF KNEES, BILATERAL: ICD-10-CM

## 2024-11-20 DIAGNOSIS — M54.16 LUMBAR RADICULOPATHY: ICD-10-CM

## 2024-11-20 DIAGNOSIS — M51.369 DEGENERATION OF LUMBAR INTERVERTEBRAL DISC: ICD-10-CM

## 2024-11-20 PROCEDURE — 99214 OFFICE O/P EST MOD 30 MIN: CPT | Performed by: PHYSICIAN ASSISTANT

## 2024-11-20 PROCEDURE — 1125F AMNT PAIN NOTED PAIN PRSNT: CPT | Performed by: PHYSICIAN ASSISTANT

## 2024-11-20 PROCEDURE — 1159F MED LIST DOCD IN RCRD: CPT | Performed by: PHYSICIAN ASSISTANT

## 2024-11-20 PROCEDURE — 1036F TOBACCO NON-USER: CPT | Performed by: PHYSICIAN ASSISTANT

## 2024-11-20 PROCEDURE — 1160F RVW MEDS BY RX/DR IN RCRD: CPT | Performed by: PHYSICIAN ASSISTANT

## 2024-11-20 PROCEDURE — 99417 PROLNG OP E/M EACH 15 MIN: CPT | Performed by: PHYSICIAN ASSISTANT

## 2024-11-20 RX ORDER — HYDROCODONE BITARTRATE AND ACETAMINOPHEN 5; 325 MG/1; MG/1
1 TABLET ORAL EVERY 8 HOURS PRN
Qty: 90 TABLET | Refills: 0 | Status: SHIPPED | OUTPATIENT
Start: 2024-12-23 | End: 2025-01-22

## 2024-11-20 RX ORDER — HYDROCODONE BITARTRATE AND ACETAMINOPHEN 5; 325 MG/1; MG/1
1 TABLET ORAL EVERY 8 HOURS PRN
Qty: 90 TABLET | Refills: 0 | Status: SHIPPED | OUTPATIENT
Start: 2024-11-24 | End: 2024-12-24

## 2024-11-20 ASSESSMENT — ENCOUNTER SYMPTOMS
VOICE CHANGE: 0
FEVER: 0
SORE THROAT: 0
BACK PAIN: 1
NAUSEA: 0
ARTHRALGIAS: 1
CHILLS: 0
SLEEP DISTURBANCE: 0
CHEST TIGHTNESS: 0
VOMITING: 0
PALPITATIONS: 0
JOINT SWELLING: 1
COUGH: 0
PAIN: 1
ACTIVITY CHANGE: 0
FATIGUE: 0
SHORTNESS OF BREATH: 0
UNEXPECTED WEIGHT CHANGE: 0
DIARRHEA: 0

## 2024-11-20 ASSESSMENT — LIFESTYLE VARIABLES
AUDIT-C TOTAL SCORE: 0
HOW OFTEN DO YOU HAVE SIX OR MORE DRINKS ON ONE OCCASION: NEVER
HOW MANY STANDARD DRINKS CONTAINING ALCOHOL DO YOU HAVE ON A TYPICAL DAY: PATIENT DOES NOT DRINK
SKIP TO QUESTIONS 9-10: 1
HOW OFTEN DO YOU HAVE A DRINK CONTAINING ALCOHOL: NEVER

## 2024-11-20 ASSESSMENT — PAIN SCALES - GENERAL
PAINLEVEL_OUTOF10: 8
PAINLEVEL_OUTOF10: 8

## 2024-11-20 ASSESSMENT — PATIENT HEALTH QUESTIONNAIRE - PHQ9
SUM OF ALL RESPONSES TO PHQ9 QUESTIONS 1 & 2: 0
1. LITTLE INTEREST OR PLEASURE IN DOING THINGS: NOT AT ALL
2. FEELING DOWN, DEPRESSED OR HOPELESS: NOT AT ALL

## 2024-11-20 ASSESSMENT — PAIN - FUNCTIONAL ASSESSMENT: PAIN_FUNCTIONAL_ASSESSMENT: 0-10

## 2024-11-20 NOTE — PROGRESS NOTES
Subjective   Patient ID: Kika Amor is a 89 y.o. female who presents for Pain.  Patient is a 89-year-old female degenerative lumbar disc disease lumbar radiculopathy bilateral knee osteoarthritis who presents today for follow-up.  On the 14th November she did have a event where her cane got caught up in her new exercise there is cord this caused her to fall into a chair hitting her left rib region.  She had increased pain went to the urgent care they ordered x-rays did a urinalysis did a culture started her on Macrobid patient developed dark urine stopped the medication due to diarrhea called her primary care got sent in a prescription for ciprofloxacin.  She did notes that the pain in the rib area is continually getting better.  Her low back is still slight achy and aggravated by lifting heavy objects.  She continues to have her knee pains.  Her son has made some alterations to her living space to help reduce fall risk    Pain  Associated symptoms include joint swelling. Pertinent negatives include no chest pain, diarrhea, fatigue, fever, nausea, shortness of breath or vomiting.       Review of Systems   Constitutional:  Negative for activity change, chills, fatigue, fever and unexpected weight change.   HENT:  Negative for ear pain, sore throat and voice change.    Eyes:  Negative for visual disturbance.   Respiratory:  Negative for cough, chest tightness and shortness of breath.    Cardiovascular:  Negative for chest pain and palpitations.   Gastrointestinal:  Negative for diarrhea, nausea and vomiting.   Musculoskeletal:  Positive for arthralgias, back pain, gait problem and joint swelling.   Psychiatric/Behavioral:  Negative for behavioral problems, self-injury, sleep disturbance and suicidal ideas.        Objective   Physical Exam  Vitals reviewed.   Constitutional:       Appearance: Normal appearance.   HENT:      Head: Normocephalic and atraumatic.      Mouth/Throat:      Mouth: Mucous membranes are  moist.   Neck:      Vascular: No JVD.   Pulmonary:      Effort: Pulmonary effort is normal. No tachypnea or bradypnea.   Abdominal:      Palpations: Abdomen is soft.   Musculoskeletal:      Thoracic back: Tenderness present.      Lumbar back: Tenderness present. Decreased range of motion. Positive left straight leg raise test. Negative right straight leg raise test.        Back:       Right knee: Swelling and crepitus present. Decreased range of motion. Tenderness present over the medial joint line and lateral joint line.      Left knee: Crepitus present. Decreased range of motion. Tenderness present over the medial joint line and lateral joint line.      Comments: Minor swelling left cva.    Skin:     General: Skin is warm and dry.   Neurological:      Mental Status: She is alert and oriented to person, place, and time.   Psychiatric:         Mood and Affect: Mood normal.         Behavior: Behavior normal. Behavior is cooperative.       Assessment/Plan   Problem List Items Addressed This Visit             ICD-10-CM    Degeneration of lumbar intervertebral disc M51.369    Lumbar radiculopathy M54.16    Primary osteoarthritis of knees, bilateral M17.0     I had nice discussion with the patient today our plan will be as follows.      Radiology: [No acute findings on x-ray scoliosis degenerative disc disease facet hypertrophy and osteopenia]      Physically:  [ continue modification of activities, healthy lifestyle choice. Patient's urine culture was reviewed and was negative I stated that patient may get diarrhea from the ciprofloxacin she may want to consider not utilizing this, to leave it at her and her primary care's decision.  Sinew the fall risk mitigation continue to follow-up with other healthcare providers ]      Psychologically:  [ No acute psychological concerns. There are no mental health issues of which I am aware that are contributing to the patient's pain. There are no substance abuse or alcohol abuse  issues of which I am aware that are contributing to the patient's pain. ]      Medication: [ I will refill the patient's opioids today for 2 month.  The patient continues to see benefit and improvement in their quality of life and ability to maintain ADLs.  Patient educated about the risks of taking opioids and operating a motor vehicle.  Patient reports no adverse side effects to current medication regimen.  Current regimen does allow patient to maintain ADLs.  Patient reports no new neurologic symptoms, new pain areas, or exacerbation in pain today.  Patient reports they are happy with current treatment care path.    [place]AR[place] was reviewed and was consistent with the history.    Patient has been educated on the risks, benefits, and alternatives of controlled substances as well as the proper way to store these medications.  The patient and I discussed the nature of this medication and its side effects.  We discussed tolerance, physical dependence, psychological dependence, addiction and opioid-induced hyperalgesia.  We discussed the potential need to wean from this medication.  We discussed the availability of programs that can help with this process if necessary.  We discussed safety issues related to opioids including safe storage.  We discussed the fact that the patient should not drive an automobile or operate heavy machinery while taking this medication.  A prescription for naloxone was offered to the patient.  The patient will be re-evaluated for the need to continue opioid therapy in 60 days. ]      Duration:  [ 2 months ]      Intervention:  [ none at this time. Patient is stable.  ]         Steve Huffman PA-C 11/20/24 2:15 PM

## 2024-11-20 NOTE — PROGRESS NOTES
MEDICATION NAME: Norco  STRENGTH: 5-325  LAST FILL DATE: 10/26/24  DATE LAST TAKEN: 24  QUANTITY FILLED: 90  QUANTITY REMAININ  COUNT COMPLETED BY: GHASSAN LOFTON RN and SUSAN IRWIN      UDS LAST COMPLETED:   CONTROLLED SUBSTANCES AGREEMENT LAST SIGNED:   ORT LAST COMPLETED:  Modified [employer] disability form filled out annually.

## 2025-01-20 ENCOUNTER — OFFICE VISIT (OUTPATIENT)
Dept: PAIN MEDICINE | Facility: CLINIC | Age: OVER 89
End: 2025-01-20
Payer: MEDICARE

## 2025-01-20 VITALS
SYSTOLIC BLOOD PRESSURE: 145 MMHG | RESPIRATION RATE: 18 BRPM | HEART RATE: 62 BPM | OXYGEN SATURATION: 98 % | DIASTOLIC BLOOD PRESSURE: 67 MMHG | HEIGHT: 62 IN | BODY MASS INDEX: 19.69 KG/M2 | WEIGHT: 107 LBS

## 2025-01-20 DIAGNOSIS — M17.0 PRIMARY OSTEOARTHRITIS OF KNEES, BILATERAL: ICD-10-CM

## 2025-01-20 DIAGNOSIS — Z79.899 HISTORY OF ONGOING TREATMENT WITH HIGH-RISK MEDICATION: Primary | ICD-10-CM

## 2025-01-20 DIAGNOSIS — M51.369 DEGENERATION OF LUMBAR INTERVERTEBRAL DISC: ICD-10-CM

## 2025-01-20 DIAGNOSIS — M54.16 LUMBAR RADICULOPATHY: ICD-10-CM

## 2025-01-20 PROCEDURE — 80346 BENZODIAZEPINES1-12: CPT | Performed by: PHYSICIAN ASSISTANT

## 2025-01-20 PROCEDURE — G2211 COMPLEX E/M VISIT ADD ON: HCPCS | Performed by: PHYSICIAN ASSISTANT

## 2025-01-20 PROCEDURE — 1160F RVW MEDS BY RX/DR IN RCRD: CPT | Performed by: PHYSICIAN ASSISTANT

## 2025-01-20 PROCEDURE — 82570 ASSAY OF URINE CREATININE: CPT | Mod: 59 | Performed by: PHYSICIAN ASSISTANT

## 2025-01-20 PROCEDURE — 1159F MED LIST DOCD IN RCRD: CPT | Performed by: PHYSICIAN ASSISTANT

## 2025-01-20 PROCEDURE — 1125F AMNT PAIN NOTED PAIN PRSNT: CPT | Performed by: PHYSICIAN ASSISTANT

## 2025-01-20 PROCEDURE — 99214 OFFICE O/P EST MOD 30 MIN: CPT | Performed by: PHYSICIAN ASSISTANT

## 2025-01-20 PROCEDURE — 1036F TOBACCO NON-USER: CPT | Performed by: PHYSICIAN ASSISTANT

## 2025-01-20 RX ORDER — HYDROCODONE BITARTRATE AND ACETAMINOPHEN 5; 325 MG/1; MG/1
1 TABLET ORAL EVERY 8 HOURS PRN
Qty: 90 TABLET | Refills: 0 | Status: SHIPPED | OUTPATIENT
Start: 2025-02-22 | End: 2025-03-24

## 2025-01-20 RX ORDER — HYDROCODONE BITARTRATE AND ACETAMINOPHEN 5; 325 MG/1; MG/1
1 TABLET ORAL EVERY 8 HOURS PRN
Qty: 90 TABLET | Refills: 0 | Status: SHIPPED | OUTPATIENT
Start: 2025-01-23 | End: 2025-02-22

## 2025-01-20 ASSESSMENT — ENCOUNTER SYMPTOMS
PALPITATIONS: 0
PAIN: 1
CHILLS: 0
CHEST TIGHTNESS: 0
ACTIVITY CHANGE: 0
SLEEP DISTURBANCE: 0
SORE THROAT: 0
DIARRHEA: 0
FEVER: 0
UNEXPECTED WEIGHT CHANGE: 0
ARTHRALGIAS: 1
NAUSEA: 0
BACK PAIN: 1
FATIGUE: 0
JOINT SWELLING: 1
VOICE CHANGE: 0
COUGH: 0
SHORTNESS OF BREATH: 0
VOMITING: 0

## 2025-01-20 ASSESSMENT — PAIN SCALES - GENERAL
PAINLEVEL_OUTOF10: 8
PAINLEVEL_OUTOF10: 8

## 2025-01-20 ASSESSMENT — PAIN - FUNCTIONAL ASSESSMENT: PAIN_FUNCTIONAL_ASSESSMENT: 0-10

## 2025-01-20 ASSESSMENT — LIFESTYLE VARIABLES
HOW OFTEN DO YOU HAVE A DRINK CONTAINING ALCOHOL: NEVER
SKIP TO QUESTIONS 9-10: 1
HOW MANY STANDARD DRINKS CONTAINING ALCOHOL DO YOU HAVE ON A TYPICAL DAY: PATIENT DOES NOT DRINK
HOW OFTEN DO YOU HAVE SIX OR MORE DRINKS ON ONE OCCASION: NEVER
AUDIT-C TOTAL SCORE: 0

## 2025-01-20 ASSESSMENT — PAIN DESCRIPTION - DESCRIPTORS: DESCRIPTORS: ACHING

## 2025-01-20 NOTE — PROGRESS NOTES
MEDICATION NAME: Norco  STRENGTH: 5-325  LAST FILL DATE: 24  DATE LAST TAKEN: 25  QUANTITY FILLED: 90  QUANTITY REMAININ  COUNT COMPLETED BY: GHASSAN LOFTON RN and SUSAN IRWIN      UDS LAST COMPLETED:   CONTROLLED SUBSTANCES AGREEMENT LAST SIGNED:   ORT LAST COMPLETED:  Modified [employer] disability form filled out annually.

## 2025-01-20 NOTE — PROGRESS NOTES
Subjective   Patient ID: Kika Amor is a 89 y.o. female who presents for Pain.  Patient is a 89-year-old female with bilateral knee osteoarthritis intervertebral disc displacement with radiculopathy the presents today for follow-up.  Patient states that she has been continued being monitored for her renal mass.  She has continued wound care and they have discontinued due to healing.  Patient called her primary care for additional antibiotics related to this.  Patient is having some double vision issues.  Patient is now going to see a new eyeball specialist.  She continues to get the shots in her eyes.  She did notes that her bilateral knee pain has been getting worse.  She still continues to be able to care for herself and she is fighting as she does not want to go into a nursing home care.    Pain  Associated symptoms include joint swelling. Pertinent negatives include no chest pain, diarrhea, fatigue, fever, nausea, shortness of breath or vomiting.       Review of Systems   Constitutional:  Negative for activity change, chills, fatigue, fever and unexpected weight change.   HENT:  Negative for ear pain, sore throat and voice change.    Eyes:  Negative for visual disturbance.   Respiratory:  Negative for cough, chest tightness and shortness of breath.    Cardiovascular:  Negative for chest pain and palpitations.   Gastrointestinal:  Negative for diarrhea, nausea and vomiting.   Musculoskeletal:  Positive for arthralgias, back pain, gait problem and joint swelling.   Psychiatric/Behavioral:  Negative for behavioral problems, self-injury, sleep disturbance and suicidal ideas.        Objective   Physical Exam  Vitals reviewed.   Constitutional:       Appearance: Normal appearance.   HENT:      Head: Normocephalic and atraumatic.      Mouth/Throat:      Mouth: Mucous membranes are moist.   Neck:      Vascular: No JVD.   Pulmonary:      Effort: Pulmonary effort is normal. No tachypnea or bradypnea.   Abdominal:       Palpations: Abdomen is soft.   Musculoskeletal:      Lumbar back: Tenderness present. Decreased range of motion. Positive left straight leg raise test. Negative right straight leg raise test.      Right knee: Crepitus present. No swelling or erythema. Decreased range of motion. Tenderness present over the medial joint line and lateral joint line.      Left knee: Crepitus present. No swelling or erythema. Decreased range of motion. Tenderness present over the medial joint line and lateral joint line.   Skin:     General: Skin is warm and dry.   Neurological:      Mental Status: She is alert and oriented to person, place, and time.   Psychiatric:         Mood and Affect: Mood normal.         Behavior: Behavior normal. Behavior is cooperative.       Assessment/Plan   Problem List Items Addressed This Visit             ICD-10-CM    Degeneration of lumbar intervertebral disc M51.369    Lumbar radiculopathy M54.16    Primary osteoarthritis of knees, bilateral M17.0   I had nice discussion with the patient today our plan will be as follows.      Radiology: [ none at this time ]      Physically:  [ continue modification of activities, healthy lifestyle choice ]      Psychologically:  [ No acute psychological concerns. There are no mental health issues of which I am aware that are contributing to the patient's pain. There are no substance abuse or alcohol abuse issues of which I am aware that are contributing to the patient's pain. ]      Medication: [ I will refill the patient's opioids today for 2 month.  The patient continues to see benefit and improvement in their quality of life and ability to maintain ADLs.  Patient educated about the risks of taking opioids and operating a motor vehicle.  Patient reports no adverse side effects to current medication regimen.  Current regimen does allow patient to maintain ADLs.  Patient reports no new neurologic symptoms, new pain areas, or exacerbation in pain today.  Patient  reports they are happy with current treatment care path.    [place]AR[place] was reviewed and was consistent with the history.    Patient has been educated on the risks, benefits, and alternatives of controlled substances as well as the proper way to store these medications.  The patient and I discussed the nature of this medication and its side effects.  We discussed tolerance, physical dependence, psychological dependence, addiction and opioid-induced hyperalgesia.  We discussed the potential need to wean from this medication.  We discussed the availability of programs that can help with this process if necessary.  We discussed safety issues related to opioids including safe storage.  We discussed the fact that the patient should not drive an automobile or operate heavy machinery while taking this medication.  A prescription for naloxone was offered to the patient.  The patient will be re-evaluated for the need to continue opioid therapy in 60 days. ]      Duration:  [ 2 months ]      Intervention:  [ none at this time. Patient is stable.  ]        Please note that this report has been produced using speech recognition software. It may contain errors related to grammar, punctuation or spelling. Electronically signed, but not reviewed.            Steve Huffman PA-C 01/20/25 3:00 PM

## 2025-01-21 LAB
AMPHETAMINES UR QL SCN: NORMAL
BARBITURATES UR QL SCN: NORMAL
BZE UR QL SCN: NORMAL
CANNABINOIDS UR QL SCN: NORMAL
CREAT UR-MCNC: 102.4 MG/DL (ref 20–320)
PCP UR QL SCN: NORMAL

## 2025-01-22 LAB
1OH-MIDAZOLAM UR CFM-MCNC: <25 NG/ML
6MAM UR CFM-MCNC: <25 NG/ML
7AMINOCLONAZEPAM UR CFM-MCNC: <25 NG/ML
A-OH ALPRAZ UR CFM-MCNC: >1000 NG/ML
ALPRAZ UR CFM-MCNC: 313 NG/ML
CHLORDIAZEP UR CFM-MCNC: <25 NG/ML
CLONAZEPAM UR CFM-MCNC: <25 NG/ML
CODEINE UR CFM-MCNC: <50 NG/ML
DIAZEPAM UR CFM-MCNC: <25 NG/ML
EDDP UR CFM-MCNC: <25 NG/ML
FENTANYL UR CFM-MCNC: <2.5 NG/ML
HYDROCODONE CTO UR CFM-MCNC: >2500 NG/ML
HYDROMORPHONE UR CFM-MCNC: 685 NG/ML
LORAZEPAM UR CFM-MCNC: <25 NG/ML
METHADONE UR CFM-MCNC: <25 NG/ML
MIDAZOLAM UR CFM-MCNC: <25 NG/ML
MORPHINE UR CFM-MCNC: <50 NG/ML
NORDIAZEPAM UR CFM-MCNC: <25 NG/ML
NORFENTANYL UR CFM-MCNC: <2.5 NG/ML
NORHYDROCODONE UR CFM-MCNC: >1000 NG/ML
NOROXYCODONE UR CFM-MCNC: <25 NG/ML
NORTRAMADOL UR-MCNC: <50 NG/ML
OXAZEPAM UR CFM-MCNC: <25 NG/ML
OXYCODONE UR CFM-MCNC: <25 NG/ML
OXYMORPHONE UR CFM-MCNC: <25 NG/ML
TEMAZEPAM UR CFM-MCNC: <25 NG/ML
TRAMADOL UR CFM-MCNC: <50 NG/ML
ZOLPIDEM UR CFM-MCNC: <25 NG/ML
ZOLPIDEM UR-MCNC: <25 NG/ML

## 2025-03-07 ENCOUNTER — APPOINTMENT (OUTPATIENT)
Dept: UROLOGY | Facility: CLINIC | Age: OVER 89
End: 2025-03-07
Payer: MEDICARE

## 2025-03-07 VITALS — WEIGHT: 107 LBS | HEIGHT: 62 IN | BODY MASS INDEX: 19.69 KG/M2 | TEMPERATURE: 98.6 F

## 2025-03-07 DIAGNOSIS — N95.2 ATROPHIC VAGINITIS: ICD-10-CM

## 2025-03-07 DIAGNOSIS — Z46.89 PESSARY MAINTENANCE: Primary | ICD-10-CM

## 2025-03-07 LAB
POC APPEARANCE, URINE: CLEAR
POC BILIRUBIN, URINE: ABNORMAL
POC BLOOD, URINE: ABNORMAL
POC COLOR, URINE: YELLOW
POC GLUCOSE, URINE: NEGATIVE MG/DL
POC KETONES, URINE: NEGATIVE MG/DL
POC LEUKOCYTES, URINE: ABNORMAL
POC NITRITE,URINE: NEGATIVE
POC PH, URINE: 5.5 PH
POC PROTEIN, URINE: ABNORMAL MG/DL
POC SPECIFIC GRAVITY, URINE: >=1.03
POC UROBILINOGEN, URINE: 0.2 EU/DL

## 2025-03-07 PROCEDURE — 1159F MED LIST DOCD IN RCRD: CPT | Performed by: STUDENT IN AN ORGANIZED HEALTH CARE EDUCATION/TRAINING PROGRAM

## 2025-03-07 PROCEDURE — 99214 OFFICE O/P EST MOD 30 MIN: CPT | Performed by: STUDENT IN AN ORGANIZED HEALTH CARE EDUCATION/TRAINING PROGRAM

## 2025-03-07 PROCEDURE — 81003 URINALYSIS AUTO W/O SCOPE: CPT | Performed by: STUDENT IN AN ORGANIZED HEALTH CARE EDUCATION/TRAINING PROGRAM

## 2025-03-07 PROCEDURE — 1036F TOBACCO NON-USER: CPT | Performed by: STUDENT IN AN ORGANIZED HEALTH CARE EDUCATION/TRAINING PROGRAM

## 2025-03-07 PROCEDURE — G2211 COMPLEX E/M VISIT ADD ON: HCPCS | Performed by: STUDENT IN AN ORGANIZED HEALTH CARE EDUCATION/TRAINING PROGRAM

## 2025-03-07 PROCEDURE — 1126F AMNT PAIN NOTED NONE PRSNT: CPT | Performed by: STUDENT IN AN ORGANIZED HEALTH CARE EDUCATION/TRAINING PROGRAM

## 2025-03-07 ASSESSMENT — PAIN SCALES - GENERAL: PAINLEVEL_OUTOF10: 0-NO PAIN

## 2025-03-07 ASSESSMENT — ENCOUNTER SYMPTOMS: FEVER: 0

## 2025-03-07 NOTE — PROGRESS NOTES
History Of Present Illness  Kika Amor is a 89 y.o. female with hx of hysterectomy presenting for pessary follow up, 2.5 inch Gellhorn short stem. Using vaginal estrogen at introitus, not using vaginal applicator. No vaginal bleeding or discharge. Previously tried 2.75 inch Gellhorn short stem placed 5/16/2023 to a 2.5 inch Gellhorn short stem 2/27/2024. Last seen Nov 2024.    Prior ovarian cyst noted stability 2022 - 2023.  Urine cx neg 11/15/24.   Glucose and renal function wnl Sept 2024.   Lower extremity hardware preventing external rotation of the bilateral hip joints     Dr. Youssef pt, here while he is on sabbatical    Prior pessary history:  Patient's cube pessary was removed 2/9/2023 with excoriations and discharge noted and the patient was refitted with a 2.25 inch Gellhorn short stem. The patient was dissatisfied with this and it was refitted 3/7/2023 with a 2.5 inch Gellhorn short stem. She noted vaginal bulging around this pessary when on her feet. She denies any pain noted some difficulty with urination. She was refitted with a 2.75 inch Gellhorn short stem 5/16/2023. We did discuss at length potential for surgical options. She is a candidate for a sacrospinous ligament suspension but she has significantly reduced external rotation of her bilateral hip joints. However there is access vaginally. We did briefly discussed the risks of bleeding, infection, damage surrounding tissues, postoperative restrictions, and success. Continue her Gellhorn pessary moving forward. There was some difficulty in removing and replacing the pessary 10/17/2023 and 2/27/2024. She was therefore refitted with a 2.5 inch Gellhorn short stem 2/27/2024. We discussed the importance of restarting her vaginal estrogen therapy now. 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 We discussed the safety of utilizing this cream in light of her renal concerns.     Past Medical History  She has a past medical history of  "Thoracic spine pain.    Surgical History  She has a past surgical history that includes Other surgical history (08/10/2022); Other surgical history (08/10/2022); Other surgical history (08/10/2022); and Other surgical history (12/05/2022).     Social History  She reports that she has never smoked. She has never used smokeless tobacco. She reports that she does not drink alcohol and does not use drugs.    Family History  Family History   Problem Relation Name Age of Onset    Diverticulitis Mother      Emphysema Father      Other (Heart problem) Father      Memory loss Sister          MEMORY ISSUES        Allergies  Cephalexin, Levofloxacin, and Levonorgestrel-ethinyl estrad    Review of Systems   Constitutional:  Negative for fever.   All other systems reviewed and are negative.       Physical Exam  Con: awake, alert, NAD  HEENT: normocephalic, speech normal  CV: no peripheral edema  Resp: no increased work of breathing  Neuro: normal mentation  Psych: mood normal  Skin: no rash  : labia normal without lesion, vagina with large 4cm ulcerations noted bilaterally on lateral walls of vagina lapex. Blood noted on pessary    Patient declined chaperone for exam     Last Recorded Vitals  Temperature 37 °C (98.6 °F), height 1.575 m (5' 2\"), weight 48.5 kg (107 lb).    Relevant Results    Results for orders placed or performed in visit on 03/07/25 (from the past 24 hours)   POCT UA Automated manually resulted   Result Value Ref Range    POC Color, Urine Yellow Straw, Yellow, Light-Yellow    POC Appearance, Urine Clear Clear    POC Glucose, Urine NEGATIVE NEGATIVE mg/dl    POC Bilirubin, Urine SMALL (1+) (A) NEGATIVE    POC Ketones, Urine NEGATIVE NEGATIVE mg/dl    POC Specific Gravity, Urine >=1.030 1.005 - 1.035    POC Blood, Urine TRACE-Intact (A) NEGATIVE    POC PH, Urine 5.5 No Reference Range Established PH    POC Protein, Urine 30 (1+) (A) NEGATIVE mg/dl    POC Urobilinogen, Urine 0.2 0.2, 1.0 EU/DL    Poc Nitrite, " Urine NEGATIVE NEGATIVE    POC Leukocytes, Urine SMALL (1+) (A) NEGATIVE       TVUS 2023  FINDINGS:  UTERUS:  Surgically absent.     RIGHT ADNEXA:  The right ovary measures 4.1 x 3.4 x 2.5 cm and demonstrates normal  flow. No gross right adnexal masses are seen, no hydrosalpinx.  Redemonstration of right ovarian cyst which now measures up to 3.3 x  2.9 x 2.7 cm, previously measuring up to 3.4 x 3.2 x 2.2 cm. No  definite internal papillary projections, significant solid component,  or vascularity is seen.     LEFT ADNEXA:  Left ovary not visualized.     CUL DE SAC:  No gross free fluid is seen in the pelvic cul-de-sac.     Impression  1.  Redemonstration of previously-seen right ovarian cyst, now  measuring 3.3 x 2.9 x 2.7 cm, previously measuring 3.4 x 3.2 x 2.2 cm.  2. Surgically absent uterus.    Renal US 2024  FINDINGS:  RIGHT KIDNEY:  The right kidney measures 9.9 cm in length. The renal cortical  echogenicity and thickness are within normal limits. No  hydronephrosis is present; no evidence of nephrolithiasis.      A simple cortical cyst in the superior right kidney measures 3.4 cm  (image 21).      A isoechoic oval-shaped mass in the mid right kidney measures 2.3 x  2.6 x 2.0 cm, mildly increased from 2.1 x 1.4 x 2.4 cm on prior study  (image 25).      LEFT KIDNEY:  The left kidney measures 10.5 in length. The renal cortical  echogenicity and thickness are within normal limits. No  hydronephrosis is present; no evidence of nephrolithiasis.      A simple cortical cyst in the mid left kidney measures up to 5.6 cm  (image 44). Another simple cortical cyst in the mid left kidney  measures 1.2 cm (image 48).      BLADDER:  The urinary bladder is unremarkable in appearance.      Incidental note of a 3.7 cm right ovarian cyst, better evaluated on  dedicated pelvic ultrasound 06/28/2023.      IMPRESSION:  1. Slight interval increase in size of a mass in the mid right kidney  measuring up to 2.6 cm, previously up to  2.4 cm. This mass remains  highly concerning for renal cell carcinoma.  2. Multiple additional bilateral simple renal cysts as above.  3. Incidental note of a 3.7 cm right ovarian cyst, better evaluated  on dedicated pelvic ultrasound 06/28/2023 but grossly similar.  4. No hydronephrosis or nephrolithiasis in either kidney.     Assessment/Plan     Pessary maintenance:   Today pessary removed, cleaned, and provided back to patient WITHOUT reinsertion  6 week break then return to reevaluate. Recommended that she utilize vaginal applicator so vaginal estrogen can reach higher in vagina    Renal mass:  Scheduled for follow up with Dr. Valarie Peguero MD, Gynecologist  [facility] Fellow  Dept of Urology/OBGYN  3/7/2025

## 2025-03-11 ENCOUNTER — APPOINTMENT (OUTPATIENT)
Dept: UROLOGY | Facility: CLINIC | Age: OVER 89
End: 2025-03-11
Payer: MEDICARE

## 2025-03-20 ENCOUNTER — OFFICE VISIT (OUTPATIENT)
Dept: PAIN MEDICINE | Facility: CLINIC | Age: OVER 89
End: 2025-03-20
Payer: MEDICARE

## 2025-03-20 VITALS
RESPIRATION RATE: 18 BRPM | BODY MASS INDEX: 19.69 KG/M2 | OXYGEN SATURATION: 98 % | HEIGHT: 62 IN | HEART RATE: 69 BPM | DIASTOLIC BLOOD PRESSURE: 71 MMHG | SYSTOLIC BLOOD PRESSURE: 159 MMHG | WEIGHT: 107 LBS

## 2025-03-20 DIAGNOSIS — M54.16 LUMBAR RADICULOPATHY: ICD-10-CM

## 2025-03-20 DIAGNOSIS — M17.0 PRIMARY OSTEOARTHRITIS OF KNEES, BILATERAL: ICD-10-CM

## 2025-03-20 DIAGNOSIS — M51.369 DEGENERATION OF LUMBAR INTERVERTEBRAL DISC: ICD-10-CM

## 2025-03-20 DIAGNOSIS — M25.551 CHRONIC RIGHT HIP PAIN: Primary | ICD-10-CM

## 2025-03-20 DIAGNOSIS — G89.29 CHRONIC RIGHT HIP PAIN: Primary | ICD-10-CM

## 2025-03-20 PROCEDURE — G2211 COMPLEX E/M VISIT ADD ON: HCPCS | Performed by: PHYSICIAN ASSISTANT

## 2025-03-20 PROCEDURE — 1160F RVW MEDS BY RX/DR IN RCRD: CPT | Performed by: PHYSICIAN ASSISTANT

## 2025-03-20 PROCEDURE — 1036F TOBACCO NON-USER: CPT | Performed by: PHYSICIAN ASSISTANT

## 2025-03-20 PROCEDURE — 1159F MED LIST DOCD IN RCRD: CPT | Performed by: PHYSICIAN ASSISTANT

## 2025-03-20 PROCEDURE — 99214 OFFICE O/P EST MOD 30 MIN: CPT | Performed by: PHYSICIAN ASSISTANT

## 2025-03-20 PROCEDURE — 1125F AMNT PAIN NOTED PAIN PRSNT: CPT | Performed by: PHYSICIAN ASSISTANT

## 2025-03-20 RX ORDER — HYDROCODONE BITARTRATE AND ACETAMINOPHEN 5; 325 MG/1; MG/1
1 TABLET ORAL EVERY 6 HOURS PRN
Qty: 100 TABLET | Refills: 0 | Status: SHIPPED | OUTPATIENT
Start: 2025-04-23 | End: 2025-05-23

## 2025-03-20 RX ORDER — HYDROCODONE BITARTRATE AND ACETAMINOPHEN 5; 325 MG/1; MG/1
1 TABLET ORAL EVERY 6 HOURS PRN
Qty: 100 TABLET | Refills: 0 | Status: SHIPPED | OUTPATIENT
Start: 2025-03-24 | End: 2025-04-23

## 2025-03-20 ASSESSMENT — PAIN DESCRIPTION - DESCRIPTORS: DESCRIPTORS: ACHING

## 2025-03-20 ASSESSMENT — ENCOUNTER SYMPTOMS
SLEEP DISTURBANCE: 0
COUGH: 0
VOMITING: 0
FEVER: 0
CHILLS: 0
VOICE CHANGE: 0
PAIN: 1
SORE THROAT: 0
FATIGUE: 0
ACTIVITY CHANGE: 0
JOINT SWELLING: 1
BACK PAIN: 1
NAUSEA: 0
SHORTNESS OF BREATH: 0
PALPITATIONS: 0
CHEST TIGHTNESS: 0
DIARRHEA: 0
UNEXPECTED WEIGHT CHANGE: 0
ARTHRALGIAS: 1

## 2025-03-20 ASSESSMENT — PATIENT HEALTH QUESTIONNAIRE - PHQ9
1. LITTLE INTEREST OR PLEASURE IN DOING THINGS: NOT AT ALL
SUM OF ALL RESPONSES TO PHQ9 QUESTIONS 1 & 2: 0
2. FEELING DOWN, DEPRESSED OR HOPELESS: NOT AT ALL

## 2025-03-20 ASSESSMENT — LIFESTYLE VARIABLES
HOW OFTEN DO YOU HAVE SIX OR MORE DRINKS ON ONE OCCASION: NEVER
AUDIT-C TOTAL SCORE: 0
HOW OFTEN DO YOU HAVE A DRINK CONTAINING ALCOHOL: NEVER
SKIP TO QUESTIONS 9-10: 1
HOW MANY STANDARD DRINKS CONTAINING ALCOHOL DO YOU HAVE ON A TYPICAL DAY: PATIENT DOES NOT DRINK

## 2025-03-20 ASSESSMENT — PAIN - FUNCTIONAL ASSESSMENT: PAIN_FUNCTIONAL_ASSESSMENT: 0-10

## 2025-03-20 ASSESSMENT — PAIN SCALES - GENERAL
PAINLEVEL_OUTOF10: 7
PAINLEVEL_OUTOF10: 7

## 2025-03-20 NOTE — PROGRESS NOTES
Subjective   Patient ID: Kika Amor is a 89 y.o. female who presents for Pain.  Patient is a 89-year-old female with multiple joint osteoarthritis bilateral knee arthritis lumbar radiculopathy and multiple medical conditions who presents today for follow-up.  She continues to follow with her ophthalmologist and continues to get eye injections continues to work with her GYN for prolapsing issues she is currently being treated for an internal wound due to a traumatic aches traction of her pessary.  Patient did notes that she has been losing weight.  She tries to stay active her sometimes her physical activity exercises exacerbate her's pain.  Patient denies specific injuries traumas    Pain  Associated symptoms include joint swelling. Pertinent negatives include no chest pain, diarrhea, fatigue, fever, nausea, shortness of breath or vomiting.       Review of Systems   Constitutional:  Negative for activity change, chills, fatigue, fever and unexpected weight change.   HENT:  Negative for ear pain, sore throat and voice change.    Eyes:  Negative for visual disturbance.   Respiratory:  Negative for cough, chest tightness and shortness of breath.    Cardiovascular:  Negative for chest pain and palpitations.   Gastrointestinal:  Negative for diarrhea, nausea and vomiting.   Musculoskeletal:  Positive for arthralgias, back pain, gait problem and joint swelling.   Psychiatric/Behavioral:  Negative for behavioral problems, self-injury, sleep disturbance and suicidal ideas.        Objective   Physical Exam  Vitals reviewed.   Constitutional:       Appearance: Normal appearance.   HENT:      Head: Normocephalic and atraumatic.      Mouth/Throat:      Mouth: Mucous membranes are moist.   Neck:      Vascular: No JVD.   Pulmonary:      Effort: Pulmonary effort is normal. No tachypnea or bradypnea.   Abdominal:      Palpations: Abdomen is soft.   Musculoskeletal:      Lumbar back: Tenderness present. Decreased range of  motion. Positive left straight leg raise test. Negative right straight leg raise test.      Right knee: Crepitus present. No swelling or erythema. Decreased range of motion. Tenderness present over the medial joint line and lateral joint line.      Left knee: Crepitus present. No swelling or erythema. Decreased range of motion. Tenderness present over the medial joint line and lateral joint line.   Skin:     General: Skin is warm and dry.   Neurological:      Mental Status: She is alert and oriented to person, place, and time.   Psychiatric:         Mood and Affect: Mood normal.         Behavior: Behavior normal. Behavior is cooperative.       Assessment/Plan   Problem List Items Addressed This Visit             ICD-10-CM    Degeneration of lumbar intervertebral disc M51.369    Lumbar radiculopathy M54.16    Primary osteoarthritis of knees, bilateral M17.0     I had nice discussion with the patient today our plan will be as follows.      Radiology: [ none at this time ]      Physically:  [ continue modification of activities, healthy lifestyle choice    Did discuss nutritional supplementation which could help with wound healing and maintaining good healthy weight that needs to be more titration with her physical exercises to prevent exacerbations.]      Psychologically:  [ No acute psychological concerns. There are no mental health issues of which I am aware that are contributing to the patient's pain. There are no substance abuse or alcohol abuse issues of which I am aware that are contributing to the patient's pain. ]      Medication: [I reviewed with my supervising physician I am in authorize patient to have an additional tablets per month for exacerbation days and traveling to appointments after she returns.  The patient continues to see benefit and improvement in their quality of life and ability to maintain ADLs.  Patient educated about the risks of taking opioids and operating a motor vehicle.  Patient  reports no adverse side effects to current medication regimen.  Current regimen does allow patient to maintain ADLs.  Patient reports no new neurologic symptoms, new pain areas, or exacerbation in pain today.  Patient reports they are happy with current treatment care path.    [place]AR[place] was reviewed and was consistent with the history.    Patient has been educated on the risks, benefits, and alternatives of controlled substances as well as the proper way to store these medications.  The patient and I discussed the nature of this medication and its side effects.  We discussed tolerance, physical dependence, psychological dependence, addiction and opioid-induced hyperalgesia.  We discussed the potential need to wean from this medication.  We discussed the availability of programs that can help with this process if necessary.  We discussed safety issues related to opioids including safe storage.  We discussed the fact that the patient should not drive an automobile or operate heavy machinery while taking this medication.  A prescription for naloxone was offered to the patient.  The patient will be re-evaluated for the need to continue opioid therapy in 60 days. ]      Duration:  [ 2 months ]      Intervention:  [ none at this time. Patient is stable.  ]        Please note that this report has been produced using speech recognition software. It may contain errors related to grammar, punctuation or spelling. Electronically signed, but not reviewed.          Steve Huffman PA-C 03/20/25 2:19 PM

## 2025-03-20 NOTE — PROGRESS NOTES
MEDICATION NAME: Norco  STRENGTH: 5-325  LAST FILL DATE: 25  DATE LAST TAKEN: 3/20/25  QUANTITY FILLED: 90  QUANTITY REMAININ  COUNT COMPLETED BY: CLEVELAND SUBRAMANIAN LPN and SUSAN IRWIN      UDS LAST COMPLETED:   CONTROLLED SUBSTANCES AGREEMENT LAST SIGNED:   ORT LAST COMPLETED:  Modified Osw[employer] disability form filled out annually.

## 2025-04-18 ENCOUNTER — APPOINTMENT (OUTPATIENT)
Dept: UROLOGY | Facility: CLINIC | Age: OVER 89
End: 2025-04-18
Payer: MEDICARE

## 2025-04-18 VITALS — TEMPERATURE: 98.6 F

## 2025-04-18 DIAGNOSIS — Z46.89 PESSARY MAINTENANCE: Primary | ICD-10-CM

## 2025-04-18 PROCEDURE — G2211 COMPLEX E/M VISIT ADD ON: HCPCS | Performed by: STUDENT IN AN ORGANIZED HEALTH CARE EDUCATION/TRAINING PROGRAM

## 2025-04-18 PROCEDURE — 1036F TOBACCO NON-USER: CPT | Performed by: STUDENT IN AN ORGANIZED HEALTH CARE EDUCATION/TRAINING PROGRAM

## 2025-04-18 PROCEDURE — 1126F AMNT PAIN NOTED NONE PRSNT: CPT | Performed by: STUDENT IN AN ORGANIZED HEALTH CARE EDUCATION/TRAINING PROGRAM

## 2025-04-18 PROCEDURE — 99214 OFFICE O/P EST MOD 30 MIN: CPT | Performed by: STUDENT IN AN ORGANIZED HEALTH CARE EDUCATION/TRAINING PROGRAM

## 2025-04-18 PROCEDURE — 1159F MED LIST DOCD IN RCRD: CPT | Performed by: STUDENT IN AN ORGANIZED HEALTH CARE EDUCATION/TRAINING PROGRAM

## 2025-04-18 ASSESSMENT — ENCOUNTER SYMPTOMS: FEVER: 0

## 2025-04-18 ASSESSMENT — PAIN SCALES - GENERAL: PAINLEVEL_OUTOF10: 0-NO PAIN

## 2025-04-18 NOTE — PROGRESS NOTES
History Of Present Illness  Kika Amor is a 89 y.o. female with hx hysterectomy presenting for pessary follow up. 2.5 inch Gellhorn short stem. Seen 3/7 and vaginal ulcerations were noted so she has been on pessary hiatus to allow for healing.    Dr. Youssef pt, here while he is on sabbatical     Past Medical History  She has a past medical history of Thoracic spine pain.    Surgical History  She has a past surgical history that includes Other surgical history (08/10/2022); Other surgical history (08/10/2022); Other surgical history (08/10/2022); and Other surgical history (12/05/2022).     Social History  She reports that she has never smoked. She has never used smokeless tobacco. She reports that she does not drink alcohol and does not use drugs.    Family History  Family History[1]     Allergies  Cephalexin, Levofloxacin, and Levonorgestrel-ethinyl estrad    Review of Systems   Constitutional:  Negative for fever.   All other systems reviewed and are negative.       Physical Exam  Con: awake, alert, NAD  HEENT: normocephalic, speech normal  CV: no peripheral edema  Resp: no increased work of breathing  Neuro: normal mentation  Psych: mood normal  Skin: no rash  : stage 4 vaginal prolapse, vaginal walls without any erosion on speculum exam  GI: rectal prolapse noted    Prior exam: : labia normal without lesion, vagina with large 4cm ulcerations noted bilaterally on lateral walls of vaginal apex. Blood noted on pessary      Last Recorded Vitals  Temperature 37 °C (98.6 °F).     Assessment/Plan     Pessary cleaned and replaced    Follow up in 3 months    Krystle Peguero MD, Gynecologist  [facility] Fellow  Dept of Urology/OBGYN  4/18/2025           [1]   Family History  Problem Relation Name Age of Onset    Diverticulitis Mother      Emphysema Father      Other (Heart problem) Father      Memory loss Sister          MEMORY ISSUES

## 2025-05-19 ENCOUNTER — HOSPITAL ENCOUNTER (OUTPATIENT)
Dept: RADIOLOGY | Facility: CLINIC | Age: OVER 89
Discharge: HOME | End: 2025-05-19
Payer: MEDICARE

## 2025-05-19 DIAGNOSIS — N28.89 RENAL MASS, RIGHT: ICD-10-CM

## 2025-05-19 PROCEDURE — 76770 US EXAM ABDO BACK WALL COMP: CPT | Performed by: STUDENT IN AN ORGANIZED HEALTH CARE EDUCATION/TRAINING PROGRAM

## 2025-05-19 PROCEDURE — 76770 US EXAM ABDO BACK WALL COMP: CPT

## 2025-05-20 ENCOUNTER — OFFICE VISIT (OUTPATIENT)
Dept: PAIN MEDICINE | Facility: CLINIC | Age: OVER 89
End: 2025-05-20
Payer: MEDICARE

## 2025-05-20 VITALS
RESPIRATION RATE: 18 BRPM | SYSTOLIC BLOOD PRESSURE: 122 MMHG | WEIGHT: 107 LBS | BODY MASS INDEX: 19.69 KG/M2 | DIASTOLIC BLOOD PRESSURE: 64 MMHG | HEIGHT: 62 IN | HEART RATE: 72 BPM

## 2025-05-20 DIAGNOSIS — M54.16 LUMBAR RADICULOPATHY: ICD-10-CM

## 2025-05-20 DIAGNOSIS — M17.0 PRIMARY OSTEOARTHRITIS OF KNEES, BILATERAL: ICD-10-CM

## 2025-05-20 DIAGNOSIS — M51.369 DEGENERATION OF LUMBAR INTERVERTEBRAL DISC: ICD-10-CM

## 2025-05-20 PROCEDURE — 99214 OFFICE O/P EST MOD 30 MIN: CPT | Performed by: PHYSICIAN ASSISTANT

## 2025-05-20 PROCEDURE — 1160F RVW MEDS BY RX/DR IN RCRD: CPT | Performed by: PHYSICIAN ASSISTANT

## 2025-05-20 PROCEDURE — G2211 COMPLEX E/M VISIT ADD ON: HCPCS | Performed by: PHYSICIAN ASSISTANT

## 2025-05-20 PROCEDURE — 1159F MED LIST DOCD IN RCRD: CPT | Performed by: PHYSICIAN ASSISTANT

## 2025-05-20 PROCEDURE — 1125F AMNT PAIN NOTED PAIN PRSNT: CPT | Performed by: PHYSICIAN ASSISTANT

## 2025-05-20 PROCEDURE — 1036F TOBACCO NON-USER: CPT | Performed by: PHYSICIAN ASSISTANT

## 2025-05-20 RX ORDER — HYDROCODONE BITARTRATE AND ACETAMINOPHEN 5; 325 MG/1; MG/1
1 TABLET ORAL EVERY 6 HOURS PRN
Qty: 100 TABLET | Refills: 0 | Status: SHIPPED | OUTPATIENT
Start: 2025-06-24 | End: 2025-07-24

## 2025-05-20 RX ORDER — HYDROCODONE BITARTRATE AND ACETAMINOPHEN 5; 325 MG/1; MG/1
1 TABLET ORAL EVERY 6 HOURS PRN
Qty: 100 TABLET | Refills: 0 | Status: SHIPPED | OUTPATIENT
Start: 2025-05-25 | End: 2025-06-24

## 2025-05-20 ASSESSMENT — LIFESTYLE VARIABLES
HOW OFTEN DO YOU HAVE SIX OR MORE DRINKS ON ONE OCCASION: NEVER
AUDIT-C TOTAL SCORE: 0
HOW MANY STANDARD DRINKS CONTAINING ALCOHOL DO YOU HAVE ON A TYPICAL DAY: PATIENT DOES NOT DRINK
HOW OFTEN DO YOU HAVE A DRINK CONTAINING ALCOHOL: NEVER
SKIP TO QUESTIONS 9-10: 1

## 2025-05-20 ASSESSMENT — ENCOUNTER SYMPTOMS
SLEEP DISTURBANCE: 0
JOINT SWELLING: 1
FATIGUE: 0
CHILLS: 0
PALPITATIONS: 0
NAUSEA: 0
ACTIVITY CHANGE: 0
COUGH: 0
BACK PAIN: 1
VOMITING: 0
UNEXPECTED WEIGHT CHANGE: 0
DIARRHEA: 0
VOICE CHANGE: 0
CHEST TIGHTNESS: 0
SORE THROAT: 0
FEVER: 0
SHORTNESS OF BREATH: 0
ARTHRALGIAS: 1

## 2025-05-20 ASSESSMENT — PAIN SCALES - GENERAL: PAINLEVEL_OUTOF10: 7

## 2025-05-20 NOTE — PROGRESS NOTES
MEDICATION NAME: Norco  STRENGTH: 5-325  LAST FILL DATE: 25  DATE LAST TAKEN: 25  QUANTITY FILLED: 100  QUANTITY REMAININ  COUNT COMPLETED BY: CLEVELAND SUBRAMANIAN LPN and SUSAN IRWIN      UDS LAST COMPLETED:   CONTROLLED SUBSTANCES AGREEMENT LAST SIGNED:   ORT LAST COMPLETED:  Modified Oswestry disability form filled out annually.

## 2025-05-20 NOTE — PROGRESS NOTES
Subjective   Patient ID: Kika Amor is a 89 y.o. female who presents for Back Pain.  Patient is a 89-year-old female with DDD lumbar with with radiculopathy bilateral knee OA poor wound healing presents today for follow-up.  Patient continues to use nutritional supplementation and eating vegetables.  She is did noted that her wound on her lower left ankle is getting better but she is developing to new pressure related pains.  They are look like there minor traumas and she does Nuys any injuries to this region she does see wound care.  She will be having potentially cataract surgery on Lizz 3.  She is still having problems with her pessary.  She does the note that her renal mass has not changed in size.  Patient continues to use her medications that allow her to get to and from her doctors appointments she denies any traumas or falls she does have close contact with her son that does keep an eye on her    Back Pain  Pertinent negatives include no chest pain or fever.       Review of Systems   Constitutional:  Negative for activity change, chills, fatigue, fever and unexpected weight change.   HENT:  Negative for ear pain, sore throat and voice change.    Eyes:  Negative for visual disturbance.   Respiratory:  Negative for cough, chest tightness and shortness of breath.    Cardiovascular:  Negative for chest pain and palpitations.   Gastrointestinal:  Negative for diarrhea, nausea and vomiting.   Musculoskeletal:  Positive for arthralgias, back pain, gait problem and joint swelling.   Psychiatric/Behavioral:  Negative for behavioral problems, self-injury, sleep disturbance and suicidal ideas.        Objective   Physical Exam  Vitals reviewed.   Constitutional:       Appearance: Normal appearance.   HENT:      Head: Normocephalic and atraumatic.      Mouth/Throat:      Mouth: Mucous membranes are moist.   Neck:      Vascular: No JVD.   Pulmonary:      Effort: Pulmonary effort is normal. No tachypnea or  bradypnea.   Abdominal:      Palpations: Abdomen is soft.   Musculoskeletal:      Lumbar back: Tenderness present. Decreased range of motion. Positive left straight leg raise test. Negative right straight leg raise test.      Right knee: Crepitus present. No swelling or erythema. Decreased range of motion. Tenderness present over the medial joint line and lateral joint line.      Left knee: Crepitus present. No swelling or erythema. Decreased range of motion. Tenderness present over the medial joint line and lateral joint line.        Legs:    Skin:     General: Skin is warm and dry.   Neurological:      Mental Status: She is alert and oriented to person, place, and time.   Psychiatric:         Mood and Affect: Mood normal.         Behavior: Behavior normal. Behavior is cooperative.       Assessment/Plan   Problem List Items Addressed This Visit           ICD-10-CM    Degeneration of lumbar intervertebral disc M51.369    Relevant Medications    HYDROcodone-acetaminophen (Norco) 5-325 mg tablet (Start on 6/24/2025)    HYDROcodone-acetaminophen (Norco) 5-325 mg tablet (Start on 5/25/2025)    Lumbar radiculopathy M54.16    Relevant Medications    HYDROcodone-acetaminophen (Norco) 5-325 mg tablet (Start on 6/24/2025)    HYDROcodone-acetaminophen (Norco) 5-325 mg tablet (Start on 5/25/2025)    Primary osteoarthritis of knees, bilateral M17.0    Relevant Medications    HYDROcodone-acetaminophen (Norco) 5-325 mg tablet (Start on 6/24/2025)    HYDROcodone-acetaminophen (Norco) 5-325 mg tablet (Start on 5/25/2025)     I had nice discussion with the patient today our plan will be as follows.      Radiology: [ none at this time ]      Physically:  [ continue modification of activities, healthy lifestyle choice ]      Psychologically:  [ No acute psychological concerns. There are no mental health issues of which I am aware that are contributing to the patient's pain. There are no substance abuse or alcohol abuse issues of which  I am aware that are contributing to the patient's pain. ]      Medication: [ I will refill the patient's opioids today for 2 month.  The patient continues to see benefit and improvement in their quality of life and ability to maintain ADLs.  Patient educated about the risks of taking opioids and operating a motor vehicle.  Patient reports no adverse side effects to current medication regimen.  Current regimen does allow patient to maintain ADLs.  Patient reports no new neurologic symptoms, new pain areas, or exacerbation in pain today.  Patient reports they are happy with current treatment care path.    [place] was reviewed and was consistent with the history.    Patient has been educated on the risks, benefits, and alternatives of controlled substances as well as the proper way to store these medications.  The patient and I discussed the nature of this medication and its side effects.  We discussed tolerance, physical dependence, psychological dependence, addiction and opioid-induced hyperalgesia.  We discussed the potential need to wean from this medication.  We discussed the availability of programs that can help with this process if necessary.  We discussed safety issues related to opioids including safe storage.  We discussed the fact that the patient should not drive an automobile or operate heavy machinery while taking this medication.  A prescription for naloxone was offered to the patient.  The patient will be re-evaluated for the need to continue opioid therapy in 60 days. ]      Duration:  [ 2 months ]      Intervention:  [ none at this time. Patient is stable.  ]        Please note that this report has been produced using speech recognition software. It may contain errors related to grammar, punctuation or spelling. Electronically signed, but not reviewed.          Steve Huffman PA-C 05/20/25 3:00 PM

## 2025-06-12 ENCOUNTER — APPOINTMENT (OUTPATIENT)
Dept: UROLOGY | Facility: CLINIC | Age: OVER 89
End: 2025-06-12
Payer: MEDICARE

## 2025-06-12 VITALS — TEMPERATURE: 97.7 F | BODY MASS INDEX: 19.57 KG/M2 | WEIGHT: 107 LBS

## 2025-06-12 DIAGNOSIS — N28.89 RENAL MASS, RIGHT: Primary | ICD-10-CM

## 2025-06-12 PROCEDURE — 1159F MED LIST DOCD IN RCRD: CPT | Performed by: STUDENT IN AN ORGANIZED HEALTH CARE EDUCATION/TRAINING PROGRAM

## 2025-06-12 PROCEDURE — 99213 OFFICE O/P EST LOW 20 MIN: CPT | Performed by: STUDENT IN AN ORGANIZED HEALTH CARE EDUCATION/TRAINING PROGRAM

## 2025-06-12 PROCEDURE — G2211 COMPLEX E/M VISIT ADD ON: HCPCS | Performed by: STUDENT IN AN ORGANIZED HEALTH CARE EDUCATION/TRAINING PROGRAM

## 2025-06-12 NOTE — PROGRESS NOTES
HPI:    Kika Amor is a 89 y.o. female previously seen by Dr. Boyce for KATINA renal cysts and gross hematuria. CT AP w/ contrast (7/22/22) showed stable 2.6 cm right renal neoplasm since 01/30/2017. US Kidney KATINA (5/26/23) showed unchanged size of a 2.4 cm solid lesion within the right interpolar kidney which demonstrates internal flow on Doppler interrogation, findings are concerning for neoplastic involvement, bilateral simple renal cysts, no hydronephrosis bilaterally. US renal (4/5/24) showed slight interval increase in size of a mass in the mid right kidney measuring up to 2.6 cm, previously up to 2.4 cm, highly concerning for renal cell carcinoma, multiple additional bilateral simple renal cysts, no hydronephrosis or nephrolithiasis in either kidney. US renal (5/19/25) showed stable solid right renal mass measuring 2.6 cm highly concerning for renal neoplasm.Good urinary function. Denies hematuria.    Cre: 0.70 (5/5/25), 0.83 (4/26/24)     CT AP w/ contrast (7/22/22): stable 2.6 cm right renal neoplasm since 01/30/2017.     US Kidney katina (5/26/23): unchanged size of a 2.4 cm solid lesion within the right interpolar kidney which demonstrates internal flow on Doppler interrogation, findings are concerning for neoplastic involvement, bilateral simple renal cysts, no hydronephrosis bilaterally.     US renal (4/5/24): slight interval increase in size of a mass in the mid right kidney measuring up to 2.6 cm, previously up to 2.4 cm, highly concerning for renal cell carcinoma, multiple additional bilateral simple renal cysts, no hydronephrosis or nephrolithiasis in either kidney.     US renal (5/19/25): stable solid right renal mass measuring 2.6 cm highly concerning for renal neoplasm.    Review of Systems:  All systems reviewed. Anything negative noted in the HPI.    Physical Exam:  Vitals signs reviewed.  Constitutional:      Appearance: Well-developed.  HENT:     Head: Normocephalic and atraumatic.  Neck:      Musculoskeletal: Normal range of motion.  Pulmonary:     Effort: Pulmonary effort is normal.  Musculoskeletal: Normal range of motion.  Skin:     General: Skin is warm and dry.  Neurological:     Mental Status: Alert and oriented to person, place, and time.  Psychiatric:        Behavior: Behavior normal.        Thought Content: Thought content normal.        Judgment: Judgment normal.    Procedures:    Assessment/Plan   Kika Amor is a 89 y.o. female previously seen by Dr. Boyce for KATINA renal cysts and gross hematuria. CT AP w/ contrast (7/22/22) showed stable 2.6 cm right renal neoplasm since 01/30/2017. US Kidney KATINA (5/26/23) showed unchanged size of a 2.4 cm solid lesion within the right interpolar kidney which demonstrates internal flow on Doppler interrogation, findings are concerning for neoplastic involvement, bilateral simple renal cysts, no hydronephrosis bilaterally. US renal (4/5/24) showed slight interval increase in size of a mass in the mid right kidney measuring up to 2.6 cm, previously up to 2.4 cm, highly concerning for renal cell carcinoma, multiple additional bilateral simple renal cysts, no hydronephrosis or nephrolithiasis in either kidney. US renal (5/19/25) showed stable solid right renal mass measuring 2.6 cm highly concerning for renal neoplasm. Good urinary function. Denies hematuria. Management options including risks, benefits and alternatives discussed at length and all questions answered. Patient prefers to proceed with follow-up virtually  in 1 month with US renal.         Scribe Attestation:  By signing my name below, Sofiya HAMMONDS Scribe   attest that this documentation has been prepared under the direction and in the presence of Js Sheppard MD.

## 2025-06-25 ENCOUNTER — APPOINTMENT (OUTPATIENT)
Dept: UROLOGY | Facility: CLINIC | Age: OVER 89
End: 2025-06-25
Payer: MEDICARE

## 2025-06-26 ENCOUNTER — APPOINTMENT (OUTPATIENT)
Dept: UROLOGY | Facility: CLINIC | Age: OVER 89
End: 2025-06-26
Payer: MEDICARE

## 2025-07-21 ENCOUNTER — OFFICE VISIT (OUTPATIENT)
Facility: CLINIC | Age: OVER 89
End: 2025-07-21
Payer: MEDICARE

## 2025-07-21 VITALS
HEART RATE: 70 BPM | OXYGEN SATURATION: 96 % | RESPIRATION RATE: 18 BRPM | SYSTOLIC BLOOD PRESSURE: 133 MMHG | BODY MASS INDEX: 19.69 KG/M2 | WEIGHT: 107 LBS | HEIGHT: 62 IN | DIASTOLIC BLOOD PRESSURE: 67 MMHG

## 2025-07-21 DIAGNOSIS — Z79.899 HISTORY OF ONGOING TREATMENT WITH HIGH-RISK MEDICATION: ICD-10-CM

## 2025-07-21 DIAGNOSIS — M17.0 PRIMARY OSTEOARTHRITIS OF KNEES, BILATERAL: ICD-10-CM

## 2025-07-21 DIAGNOSIS — M54.16 LUMBAR RADICULOPATHY: ICD-10-CM

## 2025-07-21 DIAGNOSIS — R26.9 ABNORMALITY OF GAIT: Primary | ICD-10-CM

## 2025-07-21 PROCEDURE — 1160F RVW MEDS BY RX/DR IN RCRD: CPT | Performed by: PHYSICIAN ASSISTANT

## 2025-07-21 PROCEDURE — 99214 OFFICE O/P EST MOD 30 MIN: CPT | Performed by: PHYSICIAN ASSISTANT

## 2025-07-21 PROCEDURE — 1036F TOBACCO NON-USER: CPT | Performed by: PHYSICIAN ASSISTANT

## 2025-07-21 PROCEDURE — G2211 COMPLEX E/M VISIT ADD ON: HCPCS | Performed by: PHYSICIAN ASSISTANT

## 2025-07-21 PROCEDURE — 1125F AMNT PAIN NOTED PAIN PRSNT: CPT | Performed by: PHYSICIAN ASSISTANT

## 2025-07-21 PROCEDURE — 1159F MED LIST DOCD IN RCRD: CPT | Performed by: PHYSICIAN ASSISTANT

## 2025-07-21 RX ORDER — HYDROCODONE BITARTRATE AND ACETAMINOPHEN 5; 325 MG/1; MG/1
1 TABLET ORAL EVERY 6 HOURS PRN
Qty: 100 TABLET | Refills: 0 | Status: SHIPPED | OUTPATIENT
Start: 2025-08-23 | End: 2025-09-22

## 2025-07-21 RX ORDER — HYDROCODONE BITARTRATE AND ACETAMINOPHEN 5; 325 MG/1; MG/1
1 TABLET ORAL EVERY 6 HOURS PRN
Qty: 100 TABLET | Refills: 0 | Status: SHIPPED | OUTPATIENT
Start: 2025-07-24 | End: 2025-08-23

## 2025-07-21 ASSESSMENT — LIFESTYLE VARIABLES
HOW OFTEN DO YOU HAVE A DRINK CONTAINING ALCOHOL: NEVER
SKIP TO QUESTIONS 9-10: 1
HOW OFTEN DO YOU HAVE SIX OR MORE DRINKS ON ONE OCCASION: NEVER
HOW MANY STANDARD DRINKS CONTAINING ALCOHOL DO YOU HAVE ON A TYPICAL DAY: PATIENT DOES NOT DRINK
AUDIT-C TOTAL SCORE: 0

## 2025-07-21 ASSESSMENT — ENCOUNTER SYMPTOMS
SLEEP DISTURBANCE: 0
FATIGUE: 0
BACK PAIN: 1
SORE THROAT: 0
NAUSEA: 0
SHORTNESS OF BREATH: 0
CHILLS: 0
UNEXPECTED WEIGHT CHANGE: 0
DIARRHEA: 0
ACTIVITY CHANGE: 0
FEVER: 0
PALPITATIONS: 0
VOICE CHANGE: 0
ARTHRALGIAS: 1
JOINT SWELLING: 1
VOMITING: 0
COUGH: 0
CHEST TIGHTNESS: 0

## 2025-07-21 ASSESSMENT — PAIN SCALES - GENERAL
PAINLEVEL_OUTOF10: 8
PAINLEVEL_OUTOF10: 8

## 2025-07-21 ASSESSMENT — PAIN - FUNCTIONAL ASSESSMENT: PAIN_FUNCTIONAL_ASSESSMENT: 0-10

## 2025-07-21 ASSESSMENT — PAIN DESCRIPTION - DESCRIPTORS: DESCRIPTORS: ACHING

## 2025-07-21 NOTE — PROGRESS NOTES
Subjective   Patient ID: Kika Amor is a 89 y.o. female who presents for Back Pain.  Patient is a 89-year-old female here today for follow-up.  Patient has lumbar radiculopathy abnormal gait due to leg length discrepancies bilateral knee osteoarthritis.  Patient has been having events of dizziness.  Had 2 appointments at emergency departments.  Patient has follow-ups with urology due to her pessary issues.  She has a follow-up with primary care for ultrasound thyroid nodule that was seen on CT.  Patient possibly has an ENT appointment.  Patient has been getting physical therapy exercises from her grand son.  She describes her symptoms as orthostatic hypotension with sit to stands mostly in the morning.  Patient utilizes assistive devices to help prevent falls.  The patient is currently staying with her son to mitigate fall risks and dizziness concerns    Back Pain  Pertinent negatives include no chest pain or fever.       Review of Systems   Constitutional:  Negative for activity change, chills, fatigue, fever and unexpected weight change.   HENT:  Negative for ear pain, sore throat and voice change.    Eyes:  Negative for visual disturbance.   Respiratory:  Negative for cough, chest tightness and shortness of breath.    Cardiovascular:  Negative for chest pain and palpitations.   Gastrointestinal:  Negative for diarrhea, nausea and vomiting.   Musculoskeletal:  Positive for arthralgias, back pain, gait problem and joint swelling.   Psychiatric/Behavioral:  Negative for behavioral problems, self-injury, sleep disturbance and suicidal ideas.        Objective   Physical Exam  Vitals reviewed.   Constitutional:       Appearance: Normal appearance.   HENT:      Head: Normocephalic and atraumatic.      Mouth/Throat:      Mouth: Mucous membranes are moist.   Neck:      Vascular: No JVD.   Pulmonary:      Effort: Pulmonary effort is normal. No tachypnea or bradypnea.   Abdominal:      Palpations: Abdomen is soft.      Musculoskeletal:      Lumbar back: Tenderness present. Decreased range of motion. Positive left straight leg raise test. Negative right straight leg raise test.      Right knee: Crepitus present. No swelling or erythema. Decreased range of motion. Tenderness present over the medial joint line and lateral joint line.      Left knee: Crepitus present. No swelling or erythema. Decreased range of motion. Tenderness present over the medial joint line and lateral joint line.        Legs:      Skin:     General: Skin is warm and dry.     Neurological:      Mental Status: She is alert and oriented to person, place, and time.     Psychiatric:         Mood and Affect: Mood normal.         Behavior: Behavior normal. Behavior is cooperative.         Assessment/Plan   Problem List Items Addressed This Visit           ICD-10-CM    Lumbar radiculopathy M54.16    Abnormality of gait - Primary R26.9    Primary osteoarthritis of knees, bilateral M17.0     Other Visit Diagnoses         Codes      History of ongoing treatment with high-risk medication     Z79.899    Relevant Orders    Opiate/Opioid/Benzo Prescription Compliance        I had nice discussion with the patient today our plan will be as follows.      Radiology: [ none at this time ]      Physically:  [ continue modification of activities, healthy lifestyle choice. PT may benefit from PT for balance. This could be orthostatic hypotension.  ]      Psychologically:  [ No acute psychological concerns. There are no mental health issues of which I am aware that are contributing to the patient's pain. There are no substance abuse or alcohol abuse issues of which I am aware that are contributing to the patient's pain. ]      Medication: [ I will refill the patient's opioids today for 2 month.  The patient continues to see benefit and improvement in their quality of life and ability to maintain ADLs.  Patient educated about the risks of taking opioids and operating a motor  vehicle.  Patient reports no adverse side effects to current medication regimen.  Current regimen does allow patient to maintain ADLs.  Patient reports no new neurologic symptoms, new pain areas, or exacerbation in pain today.  Patient reports they are happy with current treatment care path.    [place]AR[place] was reviewed and was consistent with the history.    Patient has been educated on the risks, benefits, and alternatives of controlled substances as well as the proper way to store these medications.  The patient and I discussed the nature of this medication and its side effects.  We discussed tolerance, physical dependence, psychological dependence, addiction and opioid-induced hyperalgesia.  We discussed the potential need to wean from this medication.  We discussed the availability of programs that can help with this process if necessary.  We discussed safety issues related to opioids including safe storage.  We discussed the fact that the patient should not drive an automobile or operate heavy machinery while taking this medication.  A prescription for naloxone was offered to the patient.  The patient will be re-evaluated for the need to continue opioid therapy in 60 days.  Pt to submit sample for tox screening.  ]      Duration:  [ 2 months ]      Intervention:  [ fall risk discussion about orthostatic hypotension prevention vs vertigo. Pt is completing HEP from grands son that is a PT. Continue to work with PCP for thyroid nodules   ]        Please note that this report has been produced using speech recognition software. It may contain errors related to grammar, punctuation or spelling. Electronically signed, but not reviewed.            Steve Huffman PA-C 07/21/25 1:50 PM

## 2025-07-21 NOTE — PROGRESS NOTES
MEDICATION NAME: Norco  STRENGTH: 5-325  LAST FILL DATE: 25  DATE LAST TAKEN: 25  QUANTITY FILLED: 100  QUANTITY REMAININ  COUNT COMPLETED BY: LUIGI CONTRERAS RN and SUSAN IRWIN      UDS COMPLETED  CONTROLLED SUBSTANCES AGREEMENT SIGNED  ORT COMPLETED  Modified Oswestry disability form filled out annually.

## 2025-07-24 LAB
1OH-MIDAZOLAM UR-MCNC: NEGATIVE NG/ML
7AMINOCLONAZEPAM UR-MCNC: NEGATIVE NG/ML
A-OH ALPRAZ UR-MCNC: 1690 NG/ML
A-OH-TRIAZOLAM UR-MCNC: NEGATIVE NG/ML
AMPHETAMINES UR QL: NEGATIVE NG/ML
BARBITURATES UR QL: NEGATIVE NG/ML
BZE UR QL: NEGATIVE NG/ML
CODEINE UR-MCNC: NEGATIVE NG/ML
CREAT UR-MCNC: 116.6 MG/DL
DRUG SCREEN COMMENT UR-IMP: ABNORMAL
EDDP UR-MCNC: NEGATIVE NG/ML
FENTANYL UR-MCNC: NEGATIVE NG/ML
HYDROCODONE UR-MCNC: 2020 NG/ML
HYDROMORPHONE UR-MCNC: 361 NG/ML
LORAZEPAM UR-MCNC: NEGATIVE NG/ML
METHADONE UR-MCNC: NEGATIVE NG/ML
MORPHINE UR-MCNC: NEGATIVE NG/ML
NORDIAZEPAM UR-MCNC: NEGATIVE NG/ML
NORFENTANYL UR-MCNC: NEGATIVE NG/ML
NORHYDROCODONE UR CFM-MCNC: 4460 NG/ML
NOROXYCODONE UR CFM-MCNC: NEGATIVE NG/ML
NORTRAMADOL UR-MCNC: NEGATIVE NG/ML
OH-ETHYLFLURAZ UR-MCNC: NEGATIVE NG/ML
OXAZEPAM UR-MCNC: NEGATIVE NG/ML
OXIDANTS UR QL: NEGATIVE MCG/ML
OXYCODONE UR CFM-MCNC: NEGATIVE NG/ML
OXYMORPHONE UR CFM-MCNC: NEGATIVE NG/ML
PCP UR QL: NEGATIVE NG/ML
PH UR: 5.5 [PH] (ref 4.5–9)
QUEST 6 ACETYLMORPHINE: NEGATIVE NG/ML
QUEST NOTES AND COMMENTS: ABNORMAL
QUEST ZOLPIDEM: NEGATIVE NG/ML
TEMAZEPAM UR-MCNC: NEGATIVE NG/ML
THC UR QL: NEGATIVE NG/ML
TRAMADOL UR-MCNC: NEGATIVE NG/ML
ZOLPIDEM PHENYL-4-CARB UR CFM-MCNC: NEGATIVE NG/ML

## 2025-08-15 ENCOUNTER — APPOINTMENT (OUTPATIENT)
Dept: UROLOGY | Facility: CLINIC | Age: OVER 89
End: 2025-08-15
Payer: MEDICARE

## 2025-08-15 VITALS — SYSTOLIC BLOOD PRESSURE: 124 MMHG | HEART RATE: 66 BPM | DIASTOLIC BLOOD PRESSURE: 62 MMHG | TEMPERATURE: 96.8 F

## 2025-08-15 DIAGNOSIS — R35.0 FREQUENCY OF MICTURITION: Primary | ICD-10-CM

## 2025-08-15 PROCEDURE — 99213 OFFICE O/P EST LOW 20 MIN: CPT | Performed by: NURSE PRACTITIONER

## 2025-08-15 PROCEDURE — 1036F TOBACCO NON-USER: CPT | Performed by: NURSE PRACTITIONER

## 2025-08-15 PROCEDURE — 1126F AMNT PAIN NOTED NONE PRSNT: CPT | Performed by: NURSE PRACTITIONER

## 2025-08-15 PROCEDURE — G2211 COMPLEX E/M VISIT ADD ON: HCPCS | Performed by: NURSE PRACTITIONER

## 2025-08-15 PROCEDURE — 1159F MED LIST DOCD IN RCRD: CPT | Performed by: NURSE PRACTITIONER

## 2025-08-15 ASSESSMENT — ENCOUNTER SYMPTOMS: FEVER: 0

## 2025-08-15 ASSESSMENT — PAIN SCALES - GENERAL: PAINLEVEL_OUTOF10: 0-NO PAIN

## 2025-08-17 LAB
APPEARANCE UR: ABNORMAL
BACTERIA #/AREA URNS HPF: ABNORMAL /HPF
BACTERIA UR CULT: ABNORMAL
BACTERIA UR CULT: ABNORMAL
BILIRUB UR QL STRIP: NEGATIVE
COLOR UR: YELLOW
GLUCOSE UR QL STRIP: NEGATIVE
HGB UR QL STRIP: ABNORMAL
HYALINE CASTS #/AREA URNS LPF: ABNORMAL /LPF
KETONES UR QL STRIP: NEGATIVE
LEUKOCYTE ESTERASE UR QL STRIP: ABNORMAL
NITRITE UR QL STRIP: NEGATIVE
PH UR STRIP: 7 [PH] (ref 5–8)
PROT UR QL STRIP: ABNORMAL
RBC #/AREA URNS HPF: ABNORMAL /HPF
SERVICE CMNT-IMP: ABNORMAL
SP GR UR STRIP: 1.02 (ref 1–1.03)
SQUAMOUS #/AREA URNS HPF: ABNORMAL /HPF
WBC #/AREA URNS HPF: ABNORMAL /HPF

## 2025-11-21 ENCOUNTER — APPOINTMENT (OUTPATIENT)
Dept: UROLOGY | Facility: CLINIC | Age: OVER 89
End: 2025-11-21
Payer: MEDICARE

## 2026-02-16 ENCOUNTER — APPOINTMENT (OUTPATIENT)
Dept: UROLOGY | Facility: CLINIC | Age: OVER 89
End: 2026-02-16
Payer: MEDICARE

## 2026-06-18 ENCOUNTER — APPOINTMENT (OUTPATIENT)
Dept: UROLOGY | Facility: CLINIC | Age: OVER 89
End: 2026-06-18
Payer: MEDICARE